# Patient Record
Sex: FEMALE | Race: WHITE | Employment: OTHER | URBAN - NONMETROPOLITAN AREA
[De-identification: names, ages, dates, MRNs, and addresses within clinical notes are randomized per-mention and may not be internally consistent; named-entity substitution may affect disease eponyms.]

---

## 2021-04-23 ENCOUNTER — OFFICE VISIT (OUTPATIENT)
Dept: ENT CLINIC | Age: 71
End: 2021-04-23
Payer: MEDICARE

## 2021-04-23 VITALS
HEART RATE: 76 BPM | RESPIRATION RATE: 14 BRPM | TEMPERATURE: 98.1 F | SYSTOLIC BLOOD PRESSURE: 126 MMHG | WEIGHT: 186.7 LBS | BODY MASS INDEX: 34.36 KG/M2 | DIASTOLIC BLOOD PRESSURE: 70 MMHG | HEIGHT: 62 IN

## 2021-04-23 DIAGNOSIS — H81.11 BENIGN PAROXYSMAL POSITIONAL VERTIGO OF RIGHT EAR: ICD-10-CM

## 2021-04-23 DIAGNOSIS — R22.1 LUMP IN NECK: Primary | ICD-10-CM

## 2021-04-23 DIAGNOSIS — R59.0 CERVICAL LYMPHADENOPATHY: ICD-10-CM

## 2021-04-23 PROCEDURE — 99203 OFFICE O/P NEW LOW 30 MIN: CPT | Performed by: OTOLARYNGOLOGY

## 2021-04-23 PROCEDURE — 1036F TOBACCO NON-USER: CPT | Performed by: OTOLARYNGOLOGY

## 2021-04-23 PROCEDURE — 95992 CANALITH REPOSITIONING PROC: CPT | Performed by: OTOLARYNGOLOGY

## 2021-04-23 PROCEDURE — 4040F PNEUMOC VAC/ADMIN/RCVD: CPT | Performed by: OTOLARYNGOLOGY

## 2021-04-23 PROCEDURE — 1090F PRES/ABSN URINE INCON ASSESS: CPT | Performed by: OTOLARYNGOLOGY

## 2021-04-23 PROCEDURE — 1123F ACP DISCUSS/DSCN MKR DOCD: CPT | Performed by: OTOLARYNGOLOGY

## 2021-04-23 PROCEDURE — G8427 DOCREV CUR MEDS BY ELIG CLIN: HCPCS | Performed by: OTOLARYNGOLOGY

## 2021-04-23 PROCEDURE — G8417 CALC BMI ABV UP PARAM F/U: HCPCS | Performed by: OTOLARYNGOLOGY

## 2021-04-23 PROCEDURE — 3017F COLORECTAL CA SCREEN DOC REV: CPT | Performed by: OTOLARYNGOLOGY

## 2021-04-23 PROCEDURE — G8400 PT W/DXA NO RESULTS DOC: HCPCS | Performed by: OTOLARYNGOLOGY

## 2021-04-23 RX ORDER — VIT C/B6/B5/MAGNESIUM/HERB 173 50-5-6-5MG
CAPSULE ORAL
COMMUNITY
End: 2022-06-30

## 2021-04-23 RX ORDER — LOSARTAN POTASSIUM 50 MG/1
50 TABLET ORAL DAILY
COMMUNITY
Start: 2021-04-01

## 2021-04-23 ASSESSMENT — ENCOUNTER SYMPTOMS
ABDOMINAL PAIN: 0
RHINORRHEA: 0
WHEEZING: 0
VOICE CHANGE: 0
COLOR CHANGE: 0
NAUSEA: 0
VOMITING: 0
APNEA: 0
TROUBLE SWALLOWING: 0
STRIDOR: 0
CHOKING: 0
DIARRHEA: 0
SINUS PRESSURE: 0
SORE THROAT: 0
COUGH: 0
SHORTNESS OF BREATH: 0
CHEST TIGHTNESS: 0
FACIAL SWELLING: 0

## 2021-04-23 NOTE — PROGRESS NOTES
note reviewed. Constitutional:       Appearance: She is well-developed. HENT:      Head: Normocephalic and atraumatic. No laceration. Comments:        Right Ear: Hearing, ear canal and external ear normal. No drainage or swelling. No middle ear effusion. Tympanic membrane is not perforated or erythematous. Left Ear: Hearing, tympanic membrane, ear canal and external ear normal. No drainage or swelling. No middle ear effusion. Tympanic membrane is not perforated or erythematous. Ears:      Comments: Scarring in eardrums     Nose: Nose normal. No septal deviation, mucosal edema or rhinorrhea. Mouth/Throat:      Mouth: Mucous membranes are not pale and not dry. No oral lesions. Pharynx: Oropharynx is clear. Uvula midline. No oropharyngeal exudate or posterior oropharyngeal erythema. Comments: LIps: lips normal     Mallampati 1  Base of tongue: symmetric,  Eyes:      Extraocular Movements:      Right eye: Normal extraocular motion and no nystagmus. Left eye: Normal extraocular motion and no nystagmus. Comments: Conjugate gaze   Neck:      Musculoskeletal: Neck supple. Thyroid: No thyroid mass or thyromegaly. Trachea: Phonation normal. No tracheal deviation. Comments:   Salivary glands not enlarged and normal to palpation    Pulmonary:      Effort: Pulmonary effort is normal. No retractions. Breath sounds: No stridor. Neurological:      Mental Status: She is alert and oriented to person, place, and time. Cranial Nerves: No cranial nerve deficit (VIIth N function intact bilat). Psychiatric:         Mood and Affect: Mood and affect normal.         Behavior: Behavior is cooperative. Epley maneuver, right side:  (canalith repositioning maneuver)  With the patient supine on the microscope table, once the findings of BPPV were confirmed, I had  the patient extend their neck, staying with the affected ear down, and tilted the bed down.  The head was then rotated in stages to the opposite side keeping the neck extended, and staying in each step-wise position for 2 minutes. With the opposite ear down, the patient was allowed to sit up and then turn their head straight. They tolerated the procedure well. Data:  All of the past medical history, past surgical history, family history,social history, allergies and current medications were reviewed with the patient. Assessment & Plan   Diagnoses and all orders for this visit:     Diagnosis Orders   1. Lump in neck  US HEAD NECK SOFT TISSUE THYROID   2. Cervical lymphadenopathy     3. Benign paroxysmal positional vertigo of right ear  IN CANALITH REPOSITIONING PROCEDURE, PER DAY       The findings were explained and her questions were answered. I was not able to palpate the mass she was describing. It felt like all adipose tissue. We did proceed with the canalith repositioning maneuver on this visit which she tolerated well. She was told not to lie flat for 2 days. Options were discussed including ordering an ultrasound of neck. She agreed. Return in about 2 weeks (around 5/7/2021) for Imaging Results Review. I, Aye Anne CMA ([facility]), am scribing for, and in the presence of Dr. Gabe Wahl. Electronically signed by Yoanna Vivas CMA ([facility]) on 4/23/21 at 1:51 PM EDT. (Please note that portions of this note were completed with a voice recognition program. Efforts were made to edit the dictations butoccasionally words are mis-transcribed.)    I agree to the above documentation placed by my scribe. I have personally evaluated this patient. Additional findings are as noted. I reviewed the scribe's note and agree with the documented findings and plan of care. Any areas of disagreement are corrected. I agree with the chief complaint, past medical history, past surgical history, allergies, medications, social and family history as documented unless otherwise noted below. Electronically signed by Abida Castorena MD on 5/9/2021 at 2:28 PM

## 2021-04-29 ENCOUNTER — HOSPITAL ENCOUNTER (OUTPATIENT)
Dept: ULTRASOUND IMAGING | Age: 71
Discharge: HOME | End: 2021-04-29
Payer: MEDICARE

## 2021-04-29 DIAGNOSIS — R22.1 LUMP IN NECK: ICD-10-CM

## 2021-04-29 PROCEDURE — 76536 US EXAM OF HEAD AND NECK: CPT

## 2021-04-30 ENCOUNTER — OFFICE VISIT (OUTPATIENT)
Dept: ENT CLINIC | Age: 71
End: 2021-04-30
Payer: MEDICARE

## 2021-04-30 VITALS
DIASTOLIC BLOOD PRESSURE: 82 MMHG | WEIGHT: 183.8 LBS | SYSTOLIC BLOOD PRESSURE: 130 MMHG | BODY MASS INDEX: 33.62 KG/M2 | RESPIRATION RATE: 12 BRPM | HEART RATE: 68 BPM | TEMPERATURE: 97.5 F

## 2021-04-30 DIAGNOSIS — R59.0 CERVICAL LYMPHADENOPATHY: ICD-10-CM

## 2021-04-30 DIAGNOSIS — R22.1 LUMP IN NECK: Primary | ICD-10-CM

## 2021-04-30 DIAGNOSIS — H81.11 BENIGN PAROXYSMAL POSITIONAL VERTIGO OF RIGHT EAR: ICD-10-CM

## 2021-04-30 PROCEDURE — 99212 OFFICE O/P EST SF 10 MIN: CPT | Performed by: OTOLARYNGOLOGY

## 2021-04-30 ASSESSMENT — ENCOUNTER SYMPTOMS
CHEST TIGHTNESS: 0
COLOR CHANGE: 0
VOICE CHANGE: 0
ABDOMINAL PAIN: 0
SINUS PRESSURE: 0
NAUSEA: 0
DIARRHEA: 0
VOMITING: 0
SHORTNESS OF BREATH: 0
CHOKING: 0
STRIDOR: 0
TROUBLE SWALLOWING: 0
RHINORRHEA: 0
FACIAL SWELLING: 0
APNEA: 0
SORE THROAT: 0
WHEEZING: 0
COUGH: 0

## 2021-04-30 NOTE — PROGRESS NOTES
[facility] [place] [facility]  [facility] EAR, NOSE AND THROAT  [facility]  Dept: [phone]  Dept Fax: [phone]  Loc: Yordy Blank is a 73 y.o. female who was referred byNo ref. provider found for:  Chief Complaint   Patient presents with    Results     Patient here for results of her Thyroid US. Yvette Rued HPI:     Dennis Saucedo is a 73 y.o. female who presents today for results of thyroid ultrasound. Thyroid US:  Bilateral supraclavicular lymph nodes are not enlarged by ultrasound criteria however architecture appears somewhat abnormal, there are rounded and elongated. Follow-up exam in 1- 3 months is recommended to confirm stability.           **This report has been created using voice recognition software.  It may contain minor errors which are inherent in voice recognition technology. **       Final report electronically signed by Dr Nimco Jasso on 4/29/2021        Feels the lump in her neck and thinks it has gotten smaller. She is turning over in bed quickly and she is feeling better. She has not had vertigo with her right ear down since the Epley maneuver    History: Allergies   Allergen Reactions    Penicillins Hives     Current Outpatient Medications   Medication Sig Dispense Refill    losartan (COZAAR) 50 MG tablet       Cholecalciferol 100 MCG (4000 UT) CAPS Take 1 capsule by mouth daily      Turmeric 500 MG CAPS Take by mouth      Cyanocobalamin (B-12 PO) Take by mouth      Calcium Carbonate-Vitamin D (CALCIUM 500 + D) 500-125 MG-UNIT TABS Take by mouth       No current facility-administered medications for this visit. Past Medical History:   Diagnosis Date    Hypertension       Past Surgical History:   Procedure Laterality Date    HERNIA REPAIR  1990    HYSTERECTOMY, TOTAL ABDOMINAL      2000    PARATHYROIDECTOMY  2015     History reviewed. No pertinent family history.   Social History Tobacco Use    Smoking status: Never Smoker    Smokeless tobacco: Never Used   Substance Use Topics    Alcohol use: Not Currently     Frequency: Never     Binge frequency: Never       Subjective:       Review of Systems   Constitutional: Negative for activity change, appetite change, chills, diaphoresis, fatigue, fever and unexpected weight change. HENT: Negative for congestion, dental problem, ear discharge, ear pain, facial swelling, hearing loss, mouth sores, nosebleeds, postnasal drip, rhinorrhea, sinus pressure, sneezing, sore throat, tinnitus, trouble swallowing and voice change. Eyes: Negative for visual disturbance. Respiratory: Negative for apnea, cough, choking, chest tightness, shortness of breath, wheezing and stridor. Cardiovascular: Negative for chest pain, palpitations and leg swelling. Gastrointestinal: Negative for abdominal pain, diarrhea, nausea and vomiting. Endocrine: Negative for cold intolerance, heat intolerance, polydipsia and polyuria. Genitourinary: Negative for dysuria, enuresis and hematuria. Musculoskeletal: Negative for arthralgias, gait problem, neck pain and neck stiffness. Skin: Negative for color change and rash. Allergic/Immunologic: Negative for environmental allergies, food allergies and immunocompromised state. Neurological: Negative for dizziness, syncope, facial asymmetry, speech difficulty, light-headedness and headaches. Hematological: Negative for adenopathy. Does not bruise/bleed easily. Psychiatric/Behavioral: Negative for confusion and sleep disturbance. The patient is not nervous/anxious.         Objective:     /82 (Site: Right Upper Arm, Position: Sitting)   Pulse 68   Temp 97.5 °F (36.4 °C) (Infrared)   Resp 12   Wt 183 lb 12.8 oz (83.4 kg)   BMI 33.62 kg/m²     Physical Exam   Neck:  Again, no palpable masses    Data:  All of the past medical history, past surgical history, family history,social history, allergies and current medications were reviewed with the patient. Assessment & Plan   Diagnoses and all orders for this visit:     Diagnosis Orders   1. Lump in neck  US THYROID   2. Cervical lymphadenopathy     3. Benign paroxysmal positional vertigo of right ear      Improved following Epley       The findings were explained and her questions were answered. Options were discussed including 2 month ultrasound follow up. Call sooner if dizziness comes back. She verbalized understanding. 3  month follow up Osvaldo Steen CMA ([facility]), am scribing for, and in the presence of Dr. Rashida Montes De Oca. Electronically signed by Sarah Camacho CMA ([facility]) on 4/30/21 at 11:50 AM EDT. (Please note that portions of this note were completed with a voice recognition program. Efforts were made to edit the dictations butoccasionally words are mis-transcribed.)    I agree to the above documentation placed by my scribe. I have personally evaluated this patient. Additional findings are as noted. I reviewed the scribe's note and agree with the documented findings and plan of care. Any areas of disagreement are corrected. I agree with the chief complaint, past medical history, past surgical history, allergies, medications, social and family history as documented unless otherwise noted below.      Electronically signed by Jamel Almaguer MD on 5/9/2021 at 2:32 PM

## 2022-06-30 ENCOUNTER — OFFICE VISIT (OUTPATIENT)
Dept: PHYSICAL MEDICINE AND REHAB | Age: 72
End: 2022-06-30
Payer: MEDICARE

## 2022-06-30 VITALS
WEIGHT: 192.4 LBS | BODY MASS INDEX: 35.41 KG/M2 | SYSTOLIC BLOOD PRESSURE: 110 MMHG | HEIGHT: 62 IN | DIASTOLIC BLOOD PRESSURE: 74 MMHG

## 2022-06-30 DIAGNOSIS — G89.29 OTHER CHRONIC PAIN: ICD-10-CM

## 2022-06-30 DIAGNOSIS — M79.18 MYOFASCIAL PAIN: ICD-10-CM

## 2022-06-30 DIAGNOSIS — G89.29 CHRONIC BILATERAL LOW BACK PAIN WITHOUT SCIATICA: ICD-10-CM

## 2022-06-30 DIAGNOSIS — M47.816 LUMBAR SPONDYLOSIS: Primary | ICD-10-CM

## 2022-06-30 DIAGNOSIS — M54.50 CHRONIC BILATERAL LOW BACK PAIN WITHOUT SCIATICA: ICD-10-CM

## 2022-06-30 PROCEDURE — 1123F ACP DISCUSS/DSCN MKR DOCD: CPT | Performed by: ANESTHESIOLOGY

## 2022-06-30 PROCEDURE — 99204 OFFICE O/P NEW MOD 45 MIN: CPT | Performed by: ANESTHESIOLOGY

## 2022-06-30 RX ORDER — METOPROLOL SUCCINATE 25 MG/1
25 TABLET, EXTENDED RELEASE ORAL NIGHTLY
COMMUNITY
Start: 2022-06-27

## 2022-06-30 NOTE — PROGRESS NOTES
Chronic Pain/PM&R Clinic Note     Encounter Date: 6/30/22    Subjective:   Chief Complaint:   Chief Complaint   Patient presents with    New Patient     lumbar pain        History of Present Illness:   Zofia Ramirez is a 81 y.o. female seen in the clinic initially on 06/30/22 upon request from [address] [place] [address]  (PCP) for her history of chronic low back pain. She states that she has been dealing with low back pain for several years without inciting event. She describes majority of her back pain to be in the small of her low back. She describes this as a constant aching 4/10 pain. She also has some pain that radiates to her left buttocks and down her right thigh at times. These are more intermittent pains for her. She denies any pain that radiates further past her knee, associated leg weakness, leg numbness/tingling, or bowel/bladder incontinence episodes. She states that her pain is worse with activities that involve standing on her feet for extended duration of time, walking, bending, or trying to ride her horse. She states her pain is alleviated with rest, sitting, and medications. 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 History of Interventions:   Surgery: No previous lumbar surgeries  Injections: None    Current Treatment Medications:   Tylenol PRN    Historical Treatment Medications:   None    Imaging/Studies:  XR L-spine (4/4/22)       Past Medical History:   Diagnosis Date    Hypertension        Past Surgical History:   Procedure Laterality Date    HERNIA REPAIR  1990    HYSTERECTOMY, TOTAL ABDOMINAL (CERVIX REMOVED)      2000    PARATHYROIDECTOMY  2015       History reviewed. No pertinent family history.       Medications & Allergies:   Current Outpatient Medications   Medication Instructions    Cholecalciferol (VITAMIN D3) 125 MCG (5000 UT) TABS Oral    losartan (COZAAR) 50 mg, Oral, DAILY    metoprolol succinate (TOPROL XL) 25 mg, Oral, DAILY Allergies   Allergen Reactions    Penicillins Hives       Review of Systems:   Constitutional: negative for weight changes or fevers  Genitourinary: negative for bowel/bladder incontinence   Musculoskeletal: positive for low back pain  Neurological: negative for any leg weakness or numbness/tingling  Behavioral/Psych: negative for anxiety/depression   All other systems reviewed and are negative    Objective:     Vitals:    06/30/22 1437   BP: 110/74     Constitutional: Pleasant, no acute distress   Head: Normocephalic, atraumatic   Eyes: Conjunctivae normal   Neck: Supple, symmetrical   Lungs: Normal respiratory effort, non-labored breathing   Cardiovascular: Limbs warm and well perfused   Abdomen: Non-protruded   Musculoskeletal: Muscle bulk symmetric, no atrophy, no gross deformities    · Lumbar Spine: ROM reduced in extension. Lumbar paraspinals tender to palpation bilaterally. SLR neg bilaterally. SAVANNA neg bilaterally. Positive facet loading bilaterally. Bilateral SI joints non-tender to palpation. Neurological: Cranial nerves II-XII grossly intact. · Gait - Antalgic gait. Ambulates without assistive device. · Motor: 5/5 muscle strength in bilateral hip flexion, knee flexion, knee extension, ankle dorsiflexion, and ankle plantar flexion   · Sensory: LT sensation intact in lower limbs   · Reflexes: 1+ symmetrical in bilateral achilles, 1+ bilateral patellar, negative ankle clonus  Skin: No rashes or lesions visible in low back  Psychological: Cooperative, no exaggerated pain behaviors       Assessment:    Diagnosis Orders   1. Lumbar spondylosis  CHG FLUOR NEEDLE/CATH SPINE/PARASPINAL DX/THER ADDON    MN INJ DX/THER AGNT PARAVERT FACET JOINT, LUMBAR/SAC, 1ST LEVEL    MN INJ DX/THER AGNT PARAVERT FACET JOINT, LUMBAR/SAC, 2ND LEVEL   2. Myofascial pain     3. Other chronic pain     4. Chronic bilateral low back pain without sciatica         Zen Aparicio is a 81 y. o.female presenting to the pain clinic for evaluation of chronic low back pain. Her clinical history and physical lamination are consistent with lumbar facet mediated pain secondary to lumbar spondylosis and degenerative disc disease. I set patient up for diagnostic lumbar medial branch block starting the bilateral facet joints of L4/L5 and L5/S1. We will anticipate proceeding forward with a lumbar radiofrequency ablation and complement this with weight loss and core strength and stability. Plan: The following treatment recommendations and plan were discussed in detail with Bev Hein. Imaging/Studies/Labs:   I have reviewed patients imaging of XR L-spine and results were discussed with patient today. Analgesics:   Patient is taking Acetaminophen. Patient informed that the maximum amount of acetaminophen taken on a regular basis should only be 4000 mg per day. Adjuvants:   None; patient wants to pursue injection therapy only. Interventions: In presence of lumbar axial back pain and with physical exam consistent for facetal pain, the option of medial branch blocks at bilateral L4/L5 and L5/S1 was chosen. The risks and benefits were discussed in detail with the patient. Patient wants to proceed with the injection. Anticoagulation/NPO Recommendations:   Patient does not need to hold any medications prior to the procedure. Patient will need to be NPO x 8 hours prior to the procedure. Plan to start an IV prior to the procedure. Multidisciplinary Pain Management:   In the presence of complex, chronic, and multi-factorial pain, the importance of a multidisciplinary approach to pain management in the patients management regimen was emphasized and discussed in great detail.    PHYSICAL THERAPY: Patient is advised to continue gentle ROM stretching as discussed in clinic    Referrals:  None    Prescriptions Written This Visit:   None    Follow-up: For lumbar MBBs      Alec Rodriguez DO  Interventional Pain Management/PM&R   [place]

## 2022-07-20 ENCOUNTER — PREP FOR PROCEDURE (OUTPATIENT)
Dept: PHYSICAL MEDICINE AND REHAB | Age: 72
End: 2022-07-20

## 2022-07-22 NOTE — DISCHARGE INSTRUCTIONS

## 2022-07-26 ENCOUNTER — HOSPITAL ENCOUNTER (OUTPATIENT)
Age: 72
Setting detail: OUTPATIENT SURGERY
Discharge: HOME | End: 2022-07-26
Attending: ANESTHESIOLOGY | Admitting: ANESTHESIOLOGY
Payer: MEDICARE

## 2022-07-26 ENCOUNTER — APPOINTMENT (OUTPATIENT)
Dept: GENERAL RADIOLOGY | Age: 72
End: 2022-07-26
Attending: ANESTHESIOLOGY
Payer: MEDICARE

## 2022-07-26 VITALS
WEIGHT: 189.6 LBS | OXYGEN SATURATION: 91 % | SYSTOLIC BLOOD PRESSURE: 111 MMHG | HEART RATE: 66 BPM | HEIGHT: 62 IN | BODY MASS INDEX: 34.89 KG/M2 | TEMPERATURE: 98.7 F | DIASTOLIC BLOOD PRESSURE: 56 MMHG | RESPIRATION RATE: 16 BRPM

## 2022-07-26 PROCEDURE — 99152 MOD SED SAME PHYS/QHP 5/>YRS: CPT | Performed by: ANESTHESIOLOGY

## 2022-07-26 PROCEDURE — 6360000002 HC RX W HCPCS: Performed by: ANESTHESIOLOGY

## 2022-07-26 PROCEDURE — 64494 INJ PARAVERT F JNT L/S 2 LEV: CPT | Performed by: ANESTHESIOLOGY

## 2022-07-26 PROCEDURE — 64493 INJ PARAVERT F JNT L/S 1 LEV: CPT | Performed by: ANESTHESIOLOGY

## 2022-07-26 PROCEDURE — 2709999900 HC NON-CHARGEABLE SUPPLY: Performed by: ANESTHESIOLOGY

## 2022-07-26 PROCEDURE — 3600000056 HC PAIN LEVEL 4 BASE: Performed by: ANESTHESIOLOGY

## 2022-07-26 PROCEDURE — 7100000010 HC PHASE II RECOVERY - FIRST 15 MIN: Performed by: ANESTHESIOLOGY

## 2022-07-26 PROCEDURE — 3209999900 FLUORO FOR SURGICAL PROCEDURES

## 2022-07-26 PROCEDURE — 2500000003 HC RX 250 WO HCPCS: Performed by: ANESTHESIOLOGY

## 2022-07-26 PROCEDURE — 7100000011 HC PHASE II RECOVERY - ADDTL 15 MIN: Performed by: ANESTHESIOLOGY

## 2022-07-26 RX ORDER — MIDAZOLAM HYDROCHLORIDE 1 MG/ML
INJECTION INTRAMUSCULAR; INTRAVENOUS PRN
Status: DISCONTINUED | OUTPATIENT
Start: 2022-07-26 | End: 2022-07-26 | Stop reason: ALTCHOICE

## 2022-07-26 RX ORDER — LIDOCAINE HYDROCHLORIDE 10 MG/ML
INJECTION, SOLUTION EPIDURAL; INFILTRATION; INTRACAUDAL; PERINEURAL PRN
Status: DISCONTINUED | OUTPATIENT
Start: 2022-07-26 | End: 2022-07-26 | Stop reason: ALTCHOICE

## 2022-07-26 RX ORDER — FENTANYL CITRATE 50 UG/ML
INJECTION, SOLUTION INTRAMUSCULAR; INTRAVENOUS PRN
Status: DISCONTINUED | OUTPATIENT
Start: 2022-07-26 | End: 2022-07-26 | Stop reason: ALTCHOICE

## 2022-07-26 RX ORDER — BUPIVACAINE HYDROCHLORIDE 5 MG/ML
INJECTION, SOLUTION PERINEURAL PRN
Status: DISCONTINUED | OUTPATIENT
Start: 2022-07-26 | End: 2022-07-26 | Stop reason: ALTCHOICE

## 2022-07-26 ASSESSMENT — PAIN SCALES - GENERAL
PAINLEVEL_OUTOF10: 0
PAINLEVEL_OUTOF10: 0

## 2022-07-26 NOTE — PROCEDURES
Pre-operative Diagnosis: Lumbar facet pain     Post-operative Diagnosis: Lumbar facet pain     Procedure: Bilateral lumbar medial branch blocks targeting facet joints of L4/L5 and L5/S1     Procedure Description:  After consent was obtained, the patient was placed in the prone position with a pillow under the abdomen to reduce the lordotic curve of the lumbar spine. The lower back was prepped with chloraprep and draped in a sterile fashion. Then, 0.5 cc of 1 % lidocaine was used for local anesthesia of the skin and superficial subcutaneous tissues. Three 22-gauge 3.5-inch spinal needles were advanced under fluoroscopy in an AP view: one at the sacral ala and two at the junction of the right superior articular process and the transverse process of the L4 and L5 vertebrae. There was no paresthesias, heme, or CSF obtained. Needle placement was confirmed using AP and oblique views. Then, 0.5 cc of 0.5% bupivacaine was injected at each site without complications. The procedure was repeated on the contralateral side. The patient tolerated the procedure well, transported to the recovery room, and discharged in ambulatory fashion.      Procedural Complications: None  Estimated Blood Loss: 0 mL      IV sedation was used during the procedure:  - Moderate intravenous conscious sedation was supervised by Dr. Sarah Rios  - The patient was independently monitored by a Registered Nurse assigned to the procedure room  - Monitoring included automated blood pressure, continuous EKG, and continuous pulse oximetry  - The detailed conscious record is permanently stored in the [place] [id number]  - The following is the conscious sedation record:  Start Time: 13:36   End Time : 13:51  Duration: 15 minutes   Medications Administered: 1 mg Versed, 50 mcg Fentanyl     Alec Rodriguez DO  Interventional Pain Management/PM&R   [facility]

## 2022-07-26 NOTE — PRE SEDATION
expectations with patient and/or responsible adult completed. 5. Patient examined immediately prior to the procedure.  (Refer to nursing sedation/analgesia documentation record)    Rosanna Galindo DO  Electronically signed 7/26/2022 at 4:41 PM

## 2022-07-26 NOTE — H&P
Today, patient presents for planned medial branch blocks at bilateral L4/L5 and L5/S1. This note is reflective of the patient's previous visit for evaluation. We will proceed with today's planned procedure. Since patient's last visit for evaluation, there have been no interval changes in medical history. Patient has no new numbness, weakness, or focal neurological deficit since evaluation. Patient has no contraindications to injection (no anticoagulation or recent antibiotic intake for active infections), and has a  present or is able to drive themselves (as discussed and cleared by physician). Allergies to latex, contrast dye, and steroid medications have been confirmed with the patient prior to the procedure. NPO necessity has been assessed and accepted based on procedure complexity. The risks and benefits of the procedure have been explained including but are not limited to infection, bleeding, paralysis, immediate post procedure weakness, and dizziness; the patient acknowledges understanding and desires to proceed with the procedure. Patient has signed consent for same procedure as discussed in previous clinic encounter. All other questions and concerns were addressed at bedside. See procedure note for full details. Post procedure Instructions: The patient was advised not to drive during the day of the procedure and not to engage in any significant decision making (unless otherwise states by physician). The patient was also advised to be cautious with walking/activity for 24 hours following today's visit and asked not to engage in over-exertion (unless otherwise states by physician). After this time, it is ok to resume pre-procedure level of activity. Patient advised to apply ice to site of injection in situations of pain and discomfort. Patient advised to not submerge site of injection during bath or pool activities for approximately 24 hours post-procedure.  Patient attested to understanding post procedure directions / restrictions. All other questions and concerns addressed before patient discharge in ambulatory fashion. Chronic Pain/PM&R Clinic Note     Encounter Date: 6/30/22    Subjective:   Chief Complaint:   No chief complaint on file. History of Present Illness:   Arnaldo Chavez is a 81 y.o. female seen in the clinic initially on 07/26/22 upon request from [address] [place] [address]  (PCP) for her history of chronic low back pain. She states that she has been dealing with low back pain for several years without inciting event. She describes majority of her back pain to be in the small of her low back. She describes this as a constant aching 4/10 pain. She also has some pain that radiates to her left buttocks and down her right thigh at times. These are more intermittent pains for her. She denies any pain that radiates further past her knee, associated leg weakness, leg numbness/tingling, or bowel/bladder incontinence episodes. She states that her pain is worse with activities that involve standing on her feet for extended duration of time, walking, bending, or trying to ride her horse. She states her pain is alleviated with rest, sitting, and medications. 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 History of Interventions:   Surgery: No previous lumbar surgeries  Injections: None    Current Treatment Medications:   Tylenol PRN    Historical Treatment Medications:   None    Imaging/Studies:  XR L-spine (4/4/22)       Past Medical History:   Diagnosis Date    Hypertension        Past Surgical History:   Procedure Laterality Date    HERNIA REPAIR  1990    HYSTERECTOMY, TOTAL ABDOMINAL (CERVIX REMOVED)      2000    PARATHYROIDECTOMY  2015       No family history on file.       Medications & Allergies:   Current Outpatient Medications   Medication Instructions    Cholecalciferol (VITAMIN D3) 125 MCG (5000 UT) TABS Oral    losartan (COZAAR) 50 mg, Oral, DAILY    metoprolol succinate (TOPROL XL) 25 mg, Oral, DAILY       Allergies   Allergen Reactions    Penicillins Hives       Review of Systems:   Constitutional: negative for weight changes or fevers  Genitourinary: negative for bowel/bladder incontinence   Musculoskeletal: positive for low back pain  Neurological: negative for any leg weakness or numbness/tingling  Behavioral/Psych: negative for anxiety/depression   All other systems reviewed and are negative    Objective: There were no vitals filed for this visit. Constitutional: Pleasant, no acute distress   Head: Normocephalic, atraumatic   Eyes: Conjunctivae normal   Neck: Supple, symmetrical   Lungs: Normal respiratory effort, non-labored breathing   Cardiovascular: Limbs warm and well perfused   Abdomen: Non-protruded   Musculoskeletal: Muscle bulk symmetric, no atrophy, no gross deformities    · Lumbar Spine: ROM reduced in extension. Lumbar paraspinals tender to palpation bilaterally. SLR neg bilaterally. SAVANNA neg bilaterally. Positive facet loading bilaterally. Bilateral SI joints non-tender to palpation. Neurological: Cranial nerves II-XII grossly intact. · Gait - Antalgic gait. Ambulates without assistive device. · Motor: 5/5 muscle strength in bilateral hip flexion, knee flexion, knee extension, ankle dorsiflexion, and ankle plantar flexion   · Sensory: LT sensation intact in lower limbs   · Reflexes: 1+ symmetrical in bilateral achilles, 1+ bilateral patellar, negative ankle clonus  Skin: No rashes or lesions visible in low back  Psychological: Cooperative, no exaggerated pain behaviors       Assessment:    Diagnosis Orders   1. Lumbar spondylosis  CHG FLUOR NEEDLE/CATH SPINE/PARASPINAL DX/THER ADDON    CT INJ DX/THER AGNT PARAVERT FACET JOINT, LUMBAR/SAC, 1ST LEVEL    CT INJ DX/THER AGNT PARAVERT FACET JOINT, LUMBAR/SAC, 2ND LEVEL   2. Myofascial pain     3. Other chronic pain     4.  Chronic bilateral low back pain without sciatica Christy Coronado is a 81 y. o.female presenting to the pain clinic for evaluation of chronic low back pain. Her clinical history and physical lamination are consistent with lumbar facet mediated pain secondary to lumbar spondylosis and degenerative disc disease. I set patient up for diagnostic lumbar medial branch block starting the bilateral facet joints of L4/L5 and L5/S1. We will anticipate proceeding forward with a lumbar radiofrequency ablation and complement this with weight loss and core strength and stability. Plan: The following treatment recommendations and plan were discussed in detail with Bev Hein. Imaging/Studies/Labs:   I have reviewed patients imaging of XR L-spine and results were discussed with patient today. Analgesics:   Patient is taking Acetaminophen. Patient informed that the maximum amount of acetaminophen taken on a regular basis should only be 4000 mg per day. Adjuvants:   None; patient wants to pursue injection therapy only. Interventions: In presence of lumbar axial back pain and with physical exam consistent for facetal pain, the option of medial branch blocks at bilateral L4/L5 and L5/S1 was chosen. The risks and benefits were discussed in detail with the patient. Patient wants to proceed with the injection. Anticoagulation/NPO Recommendations:   Patient does not need to hold any medications prior to the procedure. Patient will need to be NPO x 8 hours prior to the procedure. Plan to start an IV prior to the procedure. Multidisciplinary Pain Management:   In the presence of complex, chronic, and multi-factorial pain, the importance of a multidisciplinary approach to pain management in the patients management regimen was emphasized and discussed in great detail.    PHYSICAL THERAPY: Patient is advised to continue gentle ROM stretching as discussed in clinic    Referrals:  None    Prescriptions Written This Visit:   None    Follow-up: For lumbar MBBs      Alec Morocho, DO  Interventional Pain Management/PM&R   [place]

## 2022-07-26 NOTE — PROGRESS NOTES
[id number]  pt. Awake and oriented on arrival to phase II. Pt. Denies pain. VSS. Pt. Refusing anything to eat or drink at this time. 1400  IV discontinued. Pt. Continues to deny pain. 1410  phase II criteria met. Pt. Discharged per ambulation with nurse. Pt. Denies lightheadedness. Pt. Tolerated well. Discharge instructions with pt.

## 2022-08-08 ENCOUNTER — OFFICE VISIT (OUTPATIENT)
Dept: PHYSICAL MEDICINE AND REHAB | Age: 72
End: 2022-08-08
Payer: MEDICARE

## 2022-08-08 VITALS
BODY MASS INDEX: 34.78 KG/M2 | SYSTOLIC BLOOD PRESSURE: 124 MMHG | HEIGHT: 62 IN | DIASTOLIC BLOOD PRESSURE: 72 MMHG | WEIGHT: 189 LBS

## 2022-08-08 DIAGNOSIS — G89.29 CHRONIC BILATERAL LOW BACK PAIN WITHOUT SCIATICA: ICD-10-CM

## 2022-08-08 DIAGNOSIS — M47.816 LUMBAR SPONDYLOSIS: Primary | ICD-10-CM

## 2022-08-08 DIAGNOSIS — M79.18 MYOFASCIAL PAIN: ICD-10-CM

## 2022-08-08 DIAGNOSIS — M54.50 CHRONIC BILATERAL LOW BACK PAIN WITHOUT SCIATICA: ICD-10-CM

## 2022-08-08 DIAGNOSIS — G89.29 OTHER CHRONIC PAIN: ICD-10-CM

## 2022-08-08 PROCEDURE — 1123F ACP DISCUSS/DSCN MKR DOCD: CPT | Performed by: ANESTHESIOLOGY

## 2022-08-08 PROCEDURE — 99214 OFFICE O/P EST MOD 30 MIN: CPT | Performed by: ANESTHESIOLOGY

## 2022-08-08 NOTE — PROGRESS NOTES
Chronic Pain/PM&R Clinic Note     Encounter Date: 8/8/22    Subjective:   Chief Complaint:   Chief Complaint   Patient presents with    Follow Up After Procedure     LUMBAR FACET medial branch blocks bilateral Lumbar 4 5  5 Sacral 1 7/26/22       History of Present Illness:   [place] Herr is a 81 y.o. female seen in the clinic initially on 05/16/22 upon request from [address] [place] [address]  (PCP) for her history of chronic low back pain. She states that she has been dealing with low back pain for several years without inciting event. She describes majority of her back pain to be in the small of her low back. She describes this as a constant aching 4/10 pain. She also has some pain that radiates to her left buttocks and down her right thigh at times. These are more intermittent pains for her. She denies any pain that radiates further past her knee, associated leg weakness, leg numbness/tingling, or bowel/bladder incontinence episodes. She states that her pain is worse with activities that involve standing on her feet for extended duration of time, walking, bending, or trying to ride her horse. She states her pain is alleviated with rest, sitting, and medications. 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 Today, 8/8/2022, patient presents for planned follow-up for chronic low back pain. She underwent diagnostic lumbar medial branch blocks on 7/26/2022. She reports 100% relief lasting 3 days in duration. She states that she was able to stand upright without any low back pain during this timeframe. She states that she did not to take any breakthrough Tylenol as well. She would like to proceed with the confirmatory steps to her injection series. She denies any other new symptoms this point.       History of Interventions:   Surgery: No previous lumbar surgeries  Injections: Diagnostic lumbar MBBs (7/26/22) - 100% relief x 3 days     Current Treatment Medications:   Tylenol PRN    Historical Treatment Medications:   None    Imaging/Studies:  XR L-spine (4/4/22)       Past Medical History:   Diagnosis Date    Arthritis     Hypertension        Past Surgical History:   Procedure Laterality Date    HERNIA REPAIR  1990    HYSTERECTOMY, TOTAL ABDOMINAL (CERVIX REMOVED)      2000    PAIN MANAGEMENT PROCEDURE Bilateral 7/26/2022    LUMBAR FACET medial branch blocks bilateral Lumbar 4 5  5 Sacral 1 performed by Samira Travis DO at [facility]  2015       No family history on file. Medications & Allergies:   Current Outpatient Medications   Medication Instructions    Cholecalciferol (VITAMIN D3) 125 MCG (5000 UT) TABS Oral    losartan (COZAAR) 50 mg, Oral, DAILY    metoprolol succinate (TOPROL XL) 25 mg, Oral, Nightly       Allergies   Allergen Reactions    Penicillins Hives       Review of Systems:   Constitutional: negative for weight changes or fevers  Genitourinary: negative for bowel/bladder incontinence   Musculoskeletal: positive for low back pain  Neurological: negative for any leg weakness or numbness/tingling  Behavioral/Psych: negative for anxiety/depression   All other systems reviewed and are negative    Objective:     Vitals:    08/08/22 1608   BP: 124/72     Constitutional: Pleasant, no acute distress   Head: Normocephalic, atraumatic   Eyes: Conjunctivae normal   Neck: Supple, symmetrical   Lungs: Normal respiratory effort, non-labored breathing   Cardiovascular: Limbs warm and well perfused   Abdomen: Non-protruded   Musculoskeletal: Muscle bulk symmetric, no atrophy, no gross deformities    · Lumbar Spine: ROM reduced in extension. Lumbar paraspinals tender to palpation bilaterally. SLR neg bilaterally. SAVANNA neg bilaterally. Positive facet loading bilaterally. Bilateral SI joints non-tender to palpation. Neurological: Cranial nerves II-XII grossly intact. · Gait - Antalgic gait. Ambulates without assistive device.    · Motor: No focal motor deficits in lower limbs  Skin: No rashes or lesions visible in low back  Psychological: Cooperative, no exaggerated pain behaviors       Assessment:    Diagnosis Orders   1. Lumbar spondylosis  CHG FLUOR NEEDLE/CATH SPINE/PARASPINAL DX/THER ADDON    NM INJ DX/THER AGNT PARAVERT FACET JOINT, LUMBAR/SAC, 1ST LEVEL    NM INJ DX/THER AGNT PARAVERT FACET JOINT, LUMBAR/SAC, 2ND LEVEL      2. Myofascial pain        3. Other chronic pain        4. Chronic bilateral low back pain without sciatica              Liz Cantrell is a 81 y. o.female presenting to the pain clinic for evaluation of chronic low back pain. Her clinical history and physical lamination are consistent with lumbar facet mediated pain secondary to lumbar spondylosis and degenerative disc disease. I set patient up for diagnostic lumbar medial branch block starting the bilateral facet joints of L4/L5 and L5/S1. We will anticipate proceeding forward with a lumbar radiofrequency ablation and complement this with weight loss and core strength and stability. She underwent successful diagnostic lumbar medial branch blocks I set her up for confirmatory blocks at the same levels. Plan: The following treatment recommendations and plan were discussed in detail with Bev Hein. Imaging/Studies/Labs:   I have reviewed patients imaging of XR L-spine and results were discussed with patient today. Analgesics:   Patient is taking Acetaminophen. Patient informed that the maximum amount of acetaminophen taken on a regular basis should only be 4000 mg per day. Adjuvants:   None; patient wants to pursue injection therapy only. Interventions: In presence of lumbar axial back pain and with physical exam consistent for facetal pain, confirmatory medial branch blocks at bilateral L4/L5 and L5/S1 was chosen. The risks and benefits were discussed in detail with the patient. Patient wants to proceed with the injection.     Anticoagulation/NPO Recommendations:   Patient does not need to hold any medications prior to the procedure. Patient will need to be NPO x 8 hours prior to the procedure. Plan to start an IV prior to the procedure. Multidisciplinary Pain Management:   In the presence of complex, chronic, and multi-factorial pain, the importance of a multidisciplinary approach to pain management in the patients management regimen was emphasized and discussed in great detail.    PHYSICAL THERAPY: Patient is advised to continue gentle ROM stretching as discussed in clinic    Referrals:  None    Prescriptions Written This Visit:   None    Follow-up: For confirmatory lumbar MBBs      Shane Sepulveda DO  Interventional Pain Management/PM&R   [place]

## 2022-08-09 ENCOUNTER — TELEPHONE (OUTPATIENT)
Dept: PHYSICAL MEDICINE AND REHAB | Age: 72
End: 2022-08-09

## 2022-08-12 NOTE — DISCHARGE INSTRUCTIONS

## 2022-08-16 ENCOUNTER — PREP FOR PROCEDURE (OUTPATIENT)
Dept: PHYSICAL MEDICINE AND REHAB | Age: 72
End: 2022-08-16

## 2022-08-16 NOTE — H&P
Today, patient presents for planned confirmatory medial branch blocks at bilateral L4/L5 and L5/S1. This note is reflective of the patient's previous visit for evaluation. We will proceed with today's planned procedure. Since patient's last visit for evaluation, there have been no interval changes in medical history. Patient has no new numbness, weakness, or focal neurological deficit since evaluation. Patient has no contraindications to injection (no anticoagulation or recent antibiotic intake for active infections), and has a  present or is able to drive themselves (as discussed and cleared by physician). Allergies to latex, contrast dye, and steroid medications have been confirmed with the patient prior to the procedure. NPO necessity has been assessed and accepted based on procedure complexity. The risks and benefits of the procedure have been explained including but are not limited to infection, bleeding, paralysis, immediate post procedure weakness, and dizziness; the patient acknowledges understanding and desires to proceed with the procedure. Patient has signed consent for same procedure as discussed in previous clinic encounter. All other questions and concerns were addressed at bedside. See procedure note for full details. Post procedure Instructions: The patient was advised not to drive during the day of the procedure and not to engage in any significant decision making (unless otherwise states by physician). The patient was also advised to be cautious with walking/activity for 24 hours following today's visit and asked not to engage in over-exertion (unless otherwise states by physician). After this time, it is ok to resume pre-procedure level of activity. Patient advised to apply ice to site of injection in situations of pain and discomfort. Patient advised to not submerge site of injection during bath or pool activities for approximately 24 hours post-procedure.  Patient attested to understanding post procedure directions / restrictions. All other questions and concerns addressed before patient discharge in ambulatory fashion. Chronic Pain/PM&R Clinic Note     Encounter Date: 8/8/22    Subjective:   Chief Complaint:   No chief complaint on file. History of Present Illness:   Manju Campbell is a 81 y.o. female seen in the clinic initially on 05/16/22 upon request from [address] [place] [address]  (PCP) for her history of chronic low back pain. She states that she has been dealing with low back pain for several years without inciting event. She describes majority of her back pain to be in the small of her low back. She describes this as a constant aching 4/10 pain. She also has some pain that radiates to her left buttocks and down her right thigh at times. These are more intermittent pains for her. She denies any pain that radiates further past her knee, associated leg weakness, leg numbness/tingling, or bowel/bladder incontinence episodes. She states that her pain is worse with activities that involve standing on her feet for extended duration of time, walking, bending, or trying to ride her horse. She states her pain is alleviated with rest, sitting, and medications. 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 Today, 8/8/2022, patient presents for planned follow-up for chronic low back pain. She underwent diagnostic lumbar medial branch blocks on 7/26/2022. She reports 100% relief lasting 3 days in duration. She states that she was able to stand upright without any low back pain during this timeframe. She states that she did not to take any breakthrough Tylenol as well. She would like to proceed with the confirmatory steps to her injection series. She denies any other new symptoms this point.       History of Interventions:   Surgery: No previous lumbar surgeries  Injections: Diagnostic lumbar MBBs (7/26/22) - 100% relief x 3 days     Current Treatment Medications:   Tylenol PRN    Historical Treatment Medications:   None    Imaging/Studies:  XR L-spine (4/4/22)       Past Medical History:   Diagnosis Date    Arthritis     Hypertension        Past Surgical History:   Procedure Laterality Date    HERNIA REPAIR  1990    HYSTERECTOMY, TOTAL ABDOMINAL (CERVIX REMOVED)      2000    PAIN MANAGEMENT PROCEDURE Bilateral 7/26/2022    LUMBAR FACET medial branch blocks bilateral Lumbar 4 5  5 Sacral 1 performed by Radha Hill DO at [facility]  2015       No family history on file. Medications & Allergies:   Current Outpatient Medications   Medication Instructions    Cholecalciferol (VITAMIN D3) 125 MCG (5000 UT) TABS Oral    losartan (COZAAR) 50 mg, Oral, DAILY    metoprolol succinate (TOPROL XL) 25 mg, Oral, Nightly       Allergies   Allergen Reactions    Penicillins Hives       Review of Systems:   Constitutional: negative for weight changes or fevers  Genitourinary: negative for bowel/bladder incontinence   Musculoskeletal: positive for low back pain  Neurological: negative for any leg weakness or numbness/tingling  Behavioral/Psych: negative for anxiety/depression   All other systems reviewed and are negative    Objective: There were no vitals filed for this visit. Constitutional: Pleasant, no acute distress   Head: Normocephalic, atraumatic   Eyes: Conjunctivae normal   Neck: Supple, symmetrical   Lungs: Normal respiratory effort, non-labored breathing   Cardiovascular: Limbs warm and well perfused   Abdomen: Non-protruded   Musculoskeletal: Muscle bulk symmetric, no atrophy, no gross deformities    · Lumbar Spine: ROM reduced in extension. Lumbar paraspinals tender to palpation bilaterally. SLR neg bilaterally. SAVANNA neg bilaterally. Positive facet loading bilaterally. Bilateral SI joints non-tender to palpation. Neurological: Cranial nerves II-XII grossly intact. · Gait - Antalgic gait.  Ambulates without assistive device. · Motor: No focal motor deficits in lower limbs  Skin: No rashes or lesions visible in low back  Psychological: Cooperative, no exaggerated pain behaviors       Assessment:    Diagnosis Orders   1. Lumbar spondylosis  CHG FLUOR NEEDLE/CATH SPINE/PARASPINAL DX/THER ADDON    MN INJ DX/THER AGNT PARAVERT FACET JOINT, LUMBAR/SAC, 1ST LEVEL    MN INJ DX/THER AGNT PARAVERT FACET JOINT, LUMBAR/SAC, 2ND LEVEL      2. Myofascial pain        3. Other chronic pain        4. Chronic bilateral low back pain without sciatica              Sita Almaguer is a 81 y. o.female presenting to the pain clinic for evaluation of chronic low back pain. Her clinical history and physical lamination are consistent with lumbar facet mediated pain secondary to lumbar spondylosis and degenerative disc disease. I set patient up for diagnostic lumbar medial branch block starting the bilateral facet joints of L4/L5 and L5/S1. We will anticipate proceeding forward with a lumbar radiofrequency ablation and complement this with weight loss and core strength and stability. She underwent successful diagnostic lumbar medial branch blocks I set her up for confirmatory blocks at the same levels. Plan: The following treatment recommendations and plan were discussed in detail with Bev Hein. Imaging/Studies/Labs:   I have reviewed patients imaging of XR L-spine and results were discussed with patient today. Analgesics:   Patient is taking Acetaminophen. Patient informed that the maximum amount of acetaminophen taken on a regular basis should only be 4000 mg per day. Adjuvants:   None; patient wants to pursue injection therapy only. Interventions: In presence of lumbar axial back pain and with physical exam consistent for facetal pain, confirmatory medial branch blocks at bilateral L4/L5 and L5/S1 was chosen. The risks and benefits were discussed in detail with the patient.  Patient wants to proceed with the injection. Anticoagulation/NPO Recommendations:   Patient does not need to hold any medications prior to the procedure. Patient will need to be NPO x 8 hours prior to the procedure. Plan to start an IV prior to the procedure. Multidisciplinary Pain Management:   In the presence of complex, chronic, and multi-factorial pain, the importance of a multidisciplinary approach to pain management in the patients management regimen was emphasized and discussed in great detail.    PHYSICAL THERAPY: Patient is advised to continue gentle ROM stretching as discussed in clinic    Referrals:  None    Prescriptions Written This Visit:   None    Follow-up: For confirmatory lumbar MBBs      Rodri Randle DO  Interventional Pain Management/PM&R   [place]

## 2022-08-17 ENCOUNTER — HOSPITAL ENCOUNTER (OUTPATIENT)
Age: 72
Setting detail: OUTPATIENT SURGERY
Discharge: HOME | End: 2022-08-17
Attending: ANESTHESIOLOGY | Admitting: ANESTHESIOLOGY
Payer: MEDICARE

## 2022-08-17 ENCOUNTER — APPOINTMENT (OUTPATIENT)
Dept: GENERAL RADIOLOGY | Age: 72
End: 2022-08-17
Attending: ANESTHESIOLOGY
Payer: MEDICARE

## 2022-08-17 VITALS
WEIGHT: 189 LBS | BODY MASS INDEX: 33.49 KG/M2 | SYSTOLIC BLOOD PRESSURE: 109 MMHG | OXYGEN SATURATION: 92 % | TEMPERATURE: 97.3 F | HEART RATE: 58 BPM | RESPIRATION RATE: 16 BRPM | HEIGHT: 63 IN | DIASTOLIC BLOOD PRESSURE: 58 MMHG

## 2022-08-17 PROCEDURE — 7100000010 HC PHASE II RECOVERY - FIRST 15 MIN: Performed by: ANESTHESIOLOGY

## 2022-08-17 PROCEDURE — 99152 MOD SED SAME PHYS/QHP 5/>YRS: CPT | Performed by: ANESTHESIOLOGY

## 2022-08-17 PROCEDURE — 64493 INJ PARAVERT F JNT L/S 1 LEV: CPT | Performed by: ANESTHESIOLOGY

## 2022-08-17 PROCEDURE — 2500000003 HC RX 250 WO HCPCS: Performed by: ANESTHESIOLOGY

## 2022-08-17 PROCEDURE — 64494 INJ PARAVERT F JNT L/S 2 LEV: CPT | Performed by: ANESTHESIOLOGY

## 2022-08-17 PROCEDURE — 6360000002 HC RX W HCPCS: Performed by: ANESTHESIOLOGY

## 2022-08-17 PROCEDURE — 7100000011 HC PHASE II RECOVERY - ADDTL 15 MIN: Performed by: ANESTHESIOLOGY

## 2022-08-17 PROCEDURE — 2709999900 HC NON-CHARGEABLE SUPPLY: Performed by: ANESTHESIOLOGY

## 2022-08-17 PROCEDURE — 3209999900 FLUORO FOR SURGICAL PROCEDURES

## 2022-08-17 PROCEDURE — 3600000056 HC PAIN LEVEL 4 BASE: Performed by: ANESTHESIOLOGY

## 2022-08-17 RX ORDER — BUPIVACAINE HYDROCHLORIDE 2.5 MG/ML
INJECTION, SOLUTION EPIDURAL; INFILTRATION; INTRACAUDAL PRN
Status: DISCONTINUED | OUTPATIENT
Start: 2022-08-17 | End: 2022-08-17 | Stop reason: ALTCHOICE

## 2022-08-17 RX ORDER — MIDAZOLAM HYDROCHLORIDE 1 MG/ML
INJECTION INTRAMUSCULAR; INTRAVENOUS PRN
Status: DISCONTINUED | OUTPATIENT
Start: 2022-08-17 | End: 2022-08-17 | Stop reason: ALTCHOICE

## 2022-08-17 RX ORDER — LIDOCAINE HYDROCHLORIDE 10 MG/ML
INJECTION, SOLUTION EPIDURAL; INFILTRATION; INTRACAUDAL; PERINEURAL PRN
Status: DISCONTINUED | OUTPATIENT
Start: 2022-08-17 | End: 2022-08-17 | Stop reason: ALTCHOICE

## 2022-08-17 RX ORDER — FENTANYL CITRATE 50 UG/ML
INJECTION, SOLUTION INTRAMUSCULAR; INTRAVENOUS PRN
Status: DISCONTINUED | OUTPATIENT
Start: 2022-08-17 | End: 2022-08-17 | Stop reason: ALTCHOICE

## 2022-08-17 ASSESSMENT — PAIN - FUNCTIONAL ASSESSMENT: PAIN_FUNCTIONAL_ASSESSMENT: 0-10

## 2022-08-17 ASSESSMENT — PAIN SCALES - GENERAL: PAINLEVEL_OUTOF10: 0

## 2022-08-17 NOTE — PRE SEDATION
[facility]  Pre-Sedation/Analgesia History & Physical    Pt Name: Vida Herr  MRN: [id number]  YOB: 1950  Provider Performing Procedure: Truman Garcia DO   Primary Care Physician: Rolla Cowden, DO      MEDICAL HISTORY       has a past medical history of Arthritis and Hypertension. SURGICAL HISTORY   has a past surgical history that includes hernia repair (1990); Hysterectomy, total abdominal; parathyroidectomy (2015); and Pain management procedure (Bilateral, 7/26/2022). ALLERGIES   Allergies as of 08/09/2022 - Fully Reviewed 08/08/2022   Allergen Reaction Noted    Penicillins Hives 06/30/2016       MEDICATIONS   Prior to Admission medications    Medication Sig Start Date End Date Taking? Authorizing Provider   metoprolol succinate (TOPROL XL) 25 MG extended release tablet Take 25 mg by mouth at bedtime 6/27/22   Historical Provider, MD   Cholecalciferol (VITAMIN D3) 125 MCG (5000 UT) TABS Take by mouth    Historical Provider, MD   losartan (COZAAR) 50 MG tablet Take 50 mg by mouth daily  4/1/21   Historical Provider, MD     PHYSICAL:   Vitals:    08/17/22 1139   BP: (!) 109/58   Pulse: 58   Resp: 16   Temp: 97.3 °F (36.3 °C)   SpO2: 92%     PLANNED PROCEDURE   See procedure note  SEDATION  Planned agent: Versed and Fentanyl  ASA Classification: 2  Class 1: A normal healthy patient  Class 2: Pt with mild to moderate systemic disease  Class 3: Severe systemic disease or disturbance  Class 4: Severe systemic disorders that are already life threatening. Class 5: Moribund pt with little chances of survival, for more than 24 hours. Mallampati I Airway Classification: 2    1. Pre-procedure diagnostic studies complete and results available. 2. Previous sedation/anesthesia experiences assessed. 3. The patient is an appropriate candidate to undergo the planned procedure sedation and anesthesia. (Refer to nursing sedation/analgesia documentation record)  4.  Formulation and discussion of sedation/procedure plan, risks, and expectations with patient and/or responsible adult completed. 5. Patient examined immediately prior to the procedure.  (Refer to nursing sedation/analgesia documentation record)    Rigoberto De DO  Electronically signed 8/17/2022 at 1:40 PM

## 2022-08-17 NOTE — PROGRESS NOTES
1139 Received in Phase 2 . Drowsy responsive to verbal stimuli. Airway patent. O2 sat  92% Injection site clean and dry. Denies pain on arrival.  1143 Drink and snack given.   0 Assisted to sit at side of cart to get dressed  [id number] Discharged home via private car without complaint

## 2022-08-17 NOTE — POST SEDATION
[facility]  Sedation/Analgesia Post Sedation Record    Pt Name: Jesus Ly  MRN: [id number]  YOB: 1950  Procedure Performed By: Shubham Mora DO  Primary Care Physician: Jeremie Crocker DO    POST-PROCEDURE    Physicians/Assistants: Shubham Mora DO  Procedure Performed: See Procedure Note   Sedation/Anesthesia: Versed and Fentanyl (See procedure note for amount and duration)  Estimated Blood Loss:     0  ml  Specimens Removed: None        Complications: None           Alec Guajardo DO  Electronically signed 8/17/2022 at 1:40 PM

## 2022-09-01 ENCOUNTER — OFFICE VISIT (OUTPATIENT)
Dept: PHYSICAL MEDICINE AND REHAB | Age: 72
End: 2022-09-01
Payer: MEDICARE

## 2022-09-01 VITALS
WEIGHT: 189 LBS | SYSTOLIC BLOOD PRESSURE: 116 MMHG | BODY MASS INDEX: 33.49 KG/M2 | HEIGHT: 63 IN | DIASTOLIC BLOOD PRESSURE: 68 MMHG

## 2022-09-01 DIAGNOSIS — M54.50 CHRONIC BILATERAL LOW BACK PAIN WITHOUT SCIATICA: ICD-10-CM

## 2022-09-01 DIAGNOSIS — G89.29 CHRONIC BILATERAL LOW BACK PAIN WITHOUT SCIATICA: ICD-10-CM

## 2022-09-01 DIAGNOSIS — M47.816 LUMBAR SPONDYLOSIS: Primary | ICD-10-CM

## 2022-09-01 DIAGNOSIS — G89.29 OTHER CHRONIC PAIN: ICD-10-CM

## 2022-09-01 DIAGNOSIS — M79.18 MYOFASCIAL PAIN: ICD-10-CM

## 2022-09-01 PROCEDURE — 1123F ACP DISCUSS/DSCN MKR DOCD: CPT | Performed by: ANESTHESIOLOGY

## 2022-09-01 PROCEDURE — 99214 OFFICE O/P EST MOD 30 MIN: CPT | Performed by: ANESTHESIOLOGY

## 2022-09-01 NOTE — PROGRESS NOTES
Chronic Pain/PM&R Clinic Note     Encounter Date: 9/1/22     Subjective:   Chief Complaint:   Chief Complaint   Patient presents with    Follow Up After Procedure     medial branch blocks bilateral Lumbar4/5 and 5/Sacral 1 8/17/22       History of Present Illness:   Chen Avila is a 81 y.o. female seen in the clinic initially on 05/16/22 upon request from [address] [place] [address]  (PCP) for her history of chronic low back pain. She states that she has been dealing with low back pain for several years without inciting event. She describes majority of her back pain to be in the small of her low back. She describes this as a constant aching 4/10 pain. She also has some pain that radiates to her left buttocks and down her right thigh at times. These are more intermittent pains for her. She denies any pain that radiates further past her knee, associated leg weakness, leg numbness/tingling, or bowel/bladder incontinence episodes. She states that her pain is worse with activities that involve standing on her feet for extended duration of time, walking, bending, or trying to ride her horse. She states her pain is alleviated with rest, sitting, and medications. 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    8/8/2022, patient presents for planned follow-up for chronic low back pain. She underwent diagnostic lumbar medial branch blocks on 7/26/2022. She reports 100% relief lasting 3 days in duration. She states that she was able to stand upright without any low back pain during this timeframe. She states that she did not to take any breakthrough Tylenol as well. She would like to proceed with the confirmatory steps to her injection series. She denies any other new symptoms this point. Today, 9/1/2022, patient presents for planned follow-up for chronic low back pain. She underwent confirmatory medial branch blocks on 8/17/2022.   She reports greater than 90% relief lasting 3 days in duration. She states that she had improved ability to stand upright and ambulate without as much low back pain. She states that she would like to proceed with the radiofrequency ablation as previously discussed. She denies any other new symptoms this point. History of Interventions:   Surgery: No previous lumbar surgeries  Injections: Diagnostic lumbar MBBs (7/26/22) - 100% relief x 3 days   Confirmatory lumbar MBBs (8/17/22) - >90% relief x 3 days     Current Treatment Medications:   Tylenol PRN    Historical Treatment Medications:   None    Imaging/Studies:  XR L-spine (4/4/22)       Past Medical History:   Diagnosis Date    Arthritis     Hypertension        Past Surgical History:   Procedure Laterality Date    HERNIA REPAIR  1990    HYSTERECTOMY, TOTAL ABDOMINAL (CERVIX REMOVED)      2000    PAIN MANAGEMENT PROCEDURE Bilateral 7/26/2022    LUMBAR FACET medial branch blocks bilateral Lumbar 4 5  5 Sacral 1 performed by Abhinav Burleson DO at [facility] Bilateral 8/17/2022    medial branch blocks bilateral Lumbar4/5 and 5/Sacral 1 performed by Abhinav Burleson DO at [address] [place] [address]  2015       No family history on file.       Medications & Allergies:   Current Outpatient Medications   Medication Instructions    Cholecalciferol (VITAMIN D3) 125 MCG (5000 UT) TABS Oral    losartan (COZAAR) 50 mg, Oral, DAILY    metoprolol succinate (TOPROL XL) 25 mg, Oral, Nightly       Allergies   Allergen Reactions    Penicillins Hives       Review of Systems:   Constitutional: negative for weight changes or fevers  Genitourinary: negative for bowel/bladder incontinence   Musculoskeletal: positive for low back pain  Neurological: negative for any leg weakness or numbness/tingling  Behavioral/Psych: negative for anxiety/depression   All other systems reviewed and are negative    Objective:     Vitals:    09/01/22 1524   BP: 116/68     Constitutional: Pleasant, no acute distress   Head: Normocephalic, atraumatic   Eyes: Conjunctivae normal   Neck: Supple, symmetrical   Lungs: Normal respiratory effort, non-labored breathing   Cardiovascular: Limbs warm and well perfused   Abdomen: Non-protruded   Musculoskeletal: Muscle bulk symmetric, no atrophy, no gross deformities    · Lumbar Spine: ROM reduced in extension. Lumbar paraspinals tender to palpation bilaterally. SLR neg bilaterally. SAVANNA neg bilaterally. Positive facet loading bilaterally. Bilateral SI joints non-tender to palpation. Neurological: Cranial nerves II-XII grossly intact. · Gait - Antalgic gait. Ambulates without assistive device. · Motor: No focal motor deficits in lower limbs  Skin: No rashes or lesions visible in low back  Psychological: Cooperative, no exaggerated pain behaviors       Assessment:    Diagnosis Orders   1. Lumbar spondylosis  CHG FLUOR NEEDLE/CATH SPINE/PARASPINAL DX/THER ADDON    AL RADIOFREQUENCY NEUROTOMY LUMBAR OR SACRAL, W IMAGE GUIDANCE, SINGLE    AL RADIOFREQ NEUROTOMY LUMBAR OR SACRAL, W IMAGE GUIDE,EA ADDL LEVEL      2. Myofascial pain        3. Other chronic pain        4. Chronic bilateral low back pain without sciatica                Nikolas Najera is a 81 y. o.female presenting to the pain clinic for evaluation of chronic low back pain. Her clinical history and physical lamination are consistent with lumbar facet mediated pain secondary to lumbar spondylosis and degenerative disc disease. I set patient up for diagnostic lumbar medial branch block starting the bilateral facet joints of L4/L5 and L5/S1. We will anticipate proceeding forward with a lumbar radiofrequency ablation and complement this with weight loss and core strength and stability. She underwent successful diagnostic and confirmatory medial branch blocks. I have set her up for thermal radiofrequency ablation targeting these levels. Plan:    The following treatment recommendations and plan were discussed in detail with Bev Hein. Imaging/Studies/Labs:   I have reviewed patients imaging of XR L-spine and results were discussed with patient today. Analgesics:   Patient is taking Acetaminophen. Patient informed that the maximum amount of acetaminophen taken on a regular basis should only be 4000 mg per day. Adjuvants:   None; patient wants to pursue injection therapy only. Interventions: In presence of lumbar axial back pain and with physical exam consistent for facetal pain, thermal radiofrequency ablation targeting medial branches of bilateral L4/L5 and L5/S1 was chosen. The risks and benefits were discussed in detail with the patient. Patient wants to proceed with the injection. Anticoagulation/NPO Recommendations:   Patient does not need to hold any medications prior to the procedure. Patient will need to be NPO x 8 hours prior to the procedure. Plan to start an IV prior to the procedure. Multidisciplinary Pain Management:   In the presence of complex, chronic, and multi-factorial pain, the importance of a multidisciplinary approach to pain management in the patients management regimen was emphasized and discussed in great detail.    PHYSICAL THERAPY: Patient is advised to continue gentle ROM stretching as discussed in clinic    Referrals:  None    Prescriptions Written This Visit:   None    Follow-up: For B/L lumbar RFA      Odilia Rodriguez,   Interventional Pain Management/PM&R   [place]

## 2022-09-16 ENCOUNTER — TELEPHONE (OUTPATIENT)
Dept: PHYSICAL MEDICINE AND REHAB | Age: 72
End: 2022-09-16

## 2022-09-16 NOTE — TELEPHONE ENCOUNTER
Kostas peer to peer request received. Spoke with Isidoro Peer to Peer schedule today 9/16/2022 @ 12:30pm with Dr. Melly Johnson. He will call you phone number.   Thanks

## 2022-09-19 ENCOUNTER — PREP FOR PROCEDURE (OUTPATIENT)
Dept: PHYSICAL MEDICINE AND REHAB | Age: 72
End: 2022-09-19

## 2022-09-19 NOTE — DISCHARGE INSTRUCTIONS

## 2022-09-19 NOTE — H&P
Today, patient presents for planned thermal radiofrequency ablation targeting medial branches of bilateral L4/L5 and L5/S1    This note is reflective of the patient's previous visit for evaluation. We will proceed with today's planned procedure. Since patient's last visit for evaluation, there have been no interval changes in medical history. Patient has no new numbness, weakness, or focal neurological deficit since evaluation. Patient has no contraindications to injection (no anticoagulation or recent antibiotic intake for active infections), and has a  present or is able to drive themselves (as discussed and cleared by physician). Allergies to latex, contrast dye, and steroid medications have been confirmed with the patient prior to the procedure. NPO necessity has been assessed and accepted based on procedure complexity. The risks and benefits of the procedure have been explained including but are not limited to infection, bleeding, paralysis, immediate post procedure weakness, and dizziness; the patient acknowledges understanding and desires to proceed with the procedure. Patient has signed consent for same procedure as discussed in previous clinic encounter. All other questions and concerns were addressed at bedside. See procedure note for full details. Post procedure Instructions: The patient was advised not to drive during the day of the procedure and not to engage in any significant decision making (unless otherwise states by physician). The patient was also advised to be cautious with walking/activity for 24 hours following today's visit and asked not to engage in over-exertion (unless otherwise states by physician). After this time, it is ok to resume pre-procedure level of activity. Patient advised to apply ice to site of injection in situations of pain and discomfort. Patient advised to not submerge site of injection during bath or pool activities for approximately 24 hours post-procedure. Patient attested to understanding post procedure directions / restrictions. All other questions and concerns addressed before patient discharge in ambulatory fashion. Chronic Pain/PM&R Clinic Note     Encounter Date: 9/1/22     Subjective:   Chief Complaint:   No chief complaint on file. History of Present Illness:   Danielito Oliver is a 81 y.o. female seen in the clinic initially on 05/16/22 upon request from [address] [place] [address]  (PCP) for her history of chronic low back pain. She states that she has been dealing with low back pain for several years without inciting event. She describes majority of her back pain to be in the small of her low back. She describes this as a constant aching 4/10 pain. She also has some pain that radiates to her left buttocks and down her right thigh at times. These are more intermittent pains for her. She denies any pain that radiates further past her knee, associated leg weakness, leg numbness/tingling, or bowel/bladder incontinence episodes. She states that her pain is worse with activities that involve standing on her feet for extended duration of time, walking, bending, or trying to ride her horse. She states her pain is alleviated with rest, sitting, and medications. 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    8/8/2022, patient presents for planned follow-up for chronic low back pain. She underwent diagnostic lumbar medial branch blocks on 7/26/2022. She reports 100% relief lasting 3 days in duration. She states that she was able to stand upright without any low back pain during this timeframe. She states that she did not to take any breakthrough Tylenol as well. She would like to proceed with the confirmatory steps to her injection series. She denies any other new symptoms this point. Today, 9/1/2022, patient presents for planned follow-up for chronic low back pain.   She underwent confirmatory medial branch blocks on 8/17/2022. She reports greater than 90% relief lasting 3 days in duration. She states that she had improved ability to stand upright and ambulate without as much low back pain. She states that she would like to proceed with the radiofrequency ablation as previously discussed. She denies any other new symptoms this point. History of Interventions:   Surgery: No previous lumbar surgeries  Injections: Diagnostic lumbar MBBs (7/26/22) - 100% relief x 3 days   Confirmatory lumbar MBBs (8/17/22) - >90% relief x 3 days     Current Treatment Medications:   Tylenol PRN    Historical Treatment Medications:   None    Imaging/Studies:  XR L-spine (4/4/22)       Past Medical History:   Diagnosis Date    Arthritis     Hypertension        Past Surgical History:   Procedure Laterality Date    HERNIA REPAIR  1990    HYSTERECTOMY, TOTAL ABDOMINAL (CERVIX REMOVED)      2000    PAIN MANAGEMENT PROCEDURE Bilateral 7/26/2022    LUMBAR FACET medial branch blocks bilateral Lumbar 4 5  5 Sacral 1 performed by Zainab Arnold DO at [facility] Bilateral 8/17/2022    medial branch blocks bilateral Lumbar4/5 and 5/Sacral 1 performed by Zainab Arnold DO at [address] [place] [address]  2015       No family history on file.       Medications & Allergies:   Current Outpatient Medications   Medication Instructions    Cholecalciferol (VITAMIN D3) 125 MCG (5000 UT) TABS Oral    losartan (COZAAR) 50 mg, Oral, DAILY    metoprolol succinate (TOPROL XL) 25 mg, Oral, Nightly       Allergies   Allergen Reactions    Penicillins Hives       Review of Systems:   Constitutional: negative for weight changes or fevers  Genitourinary: negative for bowel/bladder incontinence   Musculoskeletal: positive for low back pain  Neurological: negative for any leg weakness or numbness/tingling  Behavioral/Psych: negative for anxiety/depression   All other systems reviewed and are negative    Objective: There were no vitals filed for this visit. Constitutional: Pleasant, no acute distress   Head: Normocephalic, atraumatic   Eyes: Conjunctivae normal   Neck: Supple, symmetrical   Lungs: Normal respiratory effort, non-labored breathing   Cardiovascular: Limbs warm and well perfused   Abdomen: Non-protruded   Musculoskeletal: Muscle bulk symmetric, no atrophy, no gross deformities    · Lumbar Spine: ROM reduced in extension. Lumbar paraspinals tender to palpation bilaterally. SLR neg bilaterally. SAVANNA neg bilaterally. Positive facet loading bilaterally. Bilateral SI joints non-tender to palpation. Neurological: Cranial nerves II-XII grossly intact. · Gait - Antalgic gait. Ambulates without assistive device. · Motor: No focal motor deficits in lower limbs  Skin: No rashes or lesions visible in low back  Psychological: Cooperative, no exaggerated pain behaviors       Assessment:    Diagnosis Orders   1. Lumbar spondylosis  CHG FLUOR NEEDLE/CATH SPINE/PARASPINAL DX/THER ADDON    CT RADIOFREQUENCY NEUROTOMY LUMBAR OR SACRAL, W IMAGE GUIDANCE, SINGLE    CT RADIOFREQ NEUROTOMY LUMBAR OR SACRAL, W IMAGE GUIDE,EA ADDL LEVEL      2. Myofascial pain        3. Other chronic pain        4. Chronic bilateral low back pain without sciatica                Bryan Morrell is a 81 y. o.female presenting to the pain clinic for evaluation of chronic low back pain. Her clinical history and physical lamination are consistent with lumbar facet mediated pain secondary to lumbar spondylosis and degenerative disc disease. I set patient up for diagnostic lumbar medial branch block starting the bilateral facet joints of L4/L5 and L5/S1. We will anticipate proceeding forward with a lumbar radiofrequency ablation and complement this with weight loss and core strength and stability. She underwent successful diagnostic and confirmatory medial branch blocks.   I have set her up for thermal radiofrequency ablation targeting these

## 2022-09-20 ENCOUNTER — HOSPITAL ENCOUNTER (OUTPATIENT)
Age: 72
Setting detail: OUTPATIENT SURGERY
Discharge: HOME | End: 2022-09-20
Attending: ANESTHESIOLOGY | Admitting: ANESTHESIOLOGY
Payer: MEDICARE

## 2022-09-20 ENCOUNTER — APPOINTMENT (OUTPATIENT)
Dept: GENERAL RADIOLOGY | Age: 72
End: 2022-09-20
Attending: ANESTHESIOLOGY
Payer: MEDICARE

## 2022-09-20 VITALS
OXYGEN SATURATION: 96 % | DIASTOLIC BLOOD PRESSURE: 57 MMHG | WEIGHT: 189 LBS | HEART RATE: 63 BPM | RESPIRATION RATE: 16 BRPM | BODY MASS INDEX: 34.78 KG/M2 | SYSTOLIC BLOOD PRESSURE: 95 MMHG | TEMPERATURE: 97.4 F | HEIGHT: 62 IN

## 2022-09-20 PROCEDURE — 64636 DESTROY L/S FACET JNT ADDL: CPT | Performed by: ANESTHESIOLOGY

## 2022-09-20 PROCEDURE — 64635 DESTROY LUMB/SAC FACET JNT: CPT | Performed by: ANESTHESIOLOGY

## 2022-09-20 PROCEDURE — 3209999900 FLUORO FOR SURGICAL PROCEDURES

## 2022-09-20 PROCEDURE — 6360000002 HC RX W HCPCS: Performed by: ANESTHESIOLOGY

## 2022-09-20 PROCEDURE — 99153 MOD SED SAME PHYS/QHP EA: CPT | Performed by: ANESTHESIOLOGY

## 2022-09-20 PROCEDURE — 3600000056 HC PAIN LEVEL 4 BASE: Performed by: ANESTHESIOLOGY

## 2022-09-20 PROCEDURE — 99152 MOD SED SAME PHYS/QHP 5/>YRS: CPT | Performed by: ANESTHESIOLOGY

## 2022-09-20 PROCEDURE — 2500000003 HC RX 250 WO HCPCS: Performed by: ANESTHESIOLOGY

## 2022-09-20 PROCEDURE — 7100000010 HC PHASE II RECOVERY - FIRST 15 MIN: Performed by: ANESTHESIOLOGY

## 2022-09-20 PROCEDURE — 3600000057 HC PAIN LEVEL 4 ADDL 15 MIN: Performed by: ANESTHESIOLOGY

## 2022-09-20 PROCEDURE — 2709999900 HC NON-CHARGEABLE SUPPLY: Performed by: ANESTHESIOLOGY

## 2022-09-20 PROCEDURE — 7100000011 HC PHASE II RECOVERY - ADDTL 15 MIN: Performed by: ANESTHESIOLOGY

## 2022-09-20 RX ORDER — LIDOCAINE HYDROCHLORIDE 20 MG/ML
INJECTION, SOLUTION EPIDURAL; INFILTRATION; INTRACAUDAL; PERINEURAL PRN
Status: DISCONTINUED | OUTPATIENT
Start: 2022-09-20 | End: 2022-09-20 | Stop reason: ALTCHOICE

## 2022-09-20 RX ORDER — MIDAZOLAM HYDROCHLORIDE 1 MG/ML
INJECTION INTRAMUSCULAR; INTRAVENOUS PRN
Status: DISCONTINUED | OUTPATIENT
Start: 2022-09-20 | End: 2022-09-20 | Stop reason: ALTCHOICE

## 2022-09-20 RX ORDER — LIDOCAINE HYDROCHLORIDE 10 MG/ML
INJECTION, SOLUTION EPIDURAL; INFILTRATION; INTRACAUDAL; PERINEURAL PRN
Status: DISCONTINUED | OUTPATIENT
Start: 2022-09-20 | End: 2022-09-20 | Stop reason: ALTCHOICE

## 2022-09-20 RX ORDER — FENTANYL CITRATE 50 UG/ML
INJECTION, SOLUTION INTRAMUSCULAR; INTRAVENOUS PRN
Status: DISCONTINUED | OUTPATIENT
Start: 2022-09-20 | End: 2022-09-20 | Stop reason: ALTCHOICE

## 2022-09-20 ASSESSMENT — PAIN - FUNCTIONAL ASSESSMENT: PAIN_FUNCTIONAL_ASSESSMENT: 0-10

## 2022-09-20 NOTE — PRE SEDATION
sedation/analgesia documentation record)  4. Formulation and discussion of sedation/procedure plan, risks, and expectations with patient and/or responsible adult completed. 5. Patient examined immediately prior to the procedure.  (Refer to nursing sedation/analgesia documentation record)    William Carbajal DO  Electronically signed 9/20/2022 at 1:44 PM

## 2022-09-20 NOTE — PROCEDURES
Pre-operative Diagnosis: Lumbar facet pain     Post-operative Diagnosis: Lumbar facet pain     Procedure: BILATERAL lumbar thermal radiofrequency ablation targeting facet joints L4/L5 and L5/S1    Procedure Description:  After consent was obtained, the patient was placed in the prone position with a pillow under the abdomen to reduce the lordotic curve of the lumbar spine. The lower back was prepped with chloraprep and draped in a sterile fashion. Then, 0.5 cc of 1 % lidocaine was used for local anesthesia of the skin and superficial subcutaneous tissues. Three 20-gauge 100mm SMK cannulas with 10-mm active tips were advanced under fluoroscopic guidance in an AP view to the junction of the right superior articular process and the transverse process of the L4 and L5 vertebra and at the sacral ala. There were no paresthesias, heme or CSF obtained. Needle placement was confirmed using AP and oblique views. This procedure was repeated on the contralateral side. Sensory and motor stimulation at 50Hz and 2Hz and impedance measurements were carried out having reached threshold at:     RIGHT  L4: 0.2V/3V/150-300 Ohms  L5: 0.2V/3V/150-300 Ohms  SA: 0.2V/3V/150-300 Ohms     LEFT  L4: 0.2V/3V/150-300 Ohms  L5: 0.2V/3V/150-300 Ohms  SA: 0.2V/3V/150-300 Ohms        Then, 1cc of 2% Lidocaine was injected at the site. Temperature was then raised to 80 degrees centigrade for 90 seconds with a 15 second temperature ramp. No pain was reported during the lesioning. The needles were then withdrawn without complications. The patient tolerated the procedure well. The patient was transported to the recovery room and discharged in ambulatory fashion.     Procedural Complications: None  Estimated Blood Loss: 0 mL    IV sedation was used during the procedure:  - Moderate intravenous conscious sedation was supervised by Dr. Dariusz Chino  - The patient was independently monitored by a Registered Nurse assigned to the procedure room  - Monitoring included automated blood pressure, continuous EKG, and continuous pulse oximetry  - The detailed conscious record is permanently stored in the [place] [id number]  - The following is the conscious sedation record:  Start Time: 11:26  End Time : 11:41  Duration: 15 minutes   Medications Administered: 1 mg Versed, 50 mcg Fentanyl        Alec Rodriguez DO  Interventional Pain Management/PM&R   [place]

## 2022-09-20 NOTE — PROGRESS NOTES
1144: Patient arrived to Phase II Recovery via cart. Awake and alert. Report received from Charity Caraballo, [address]: VSS, patient denies pain. Denies snack and drink. Call light placed within reach. 1205: IV removed without complications. Band aid applied to site. Patient sat edge of bed without difficulty and dressed independently in room. 1213: Patient ambulatory to vehicle and discharged home in stable condition with .

## 2022-09-20 NOTE — POST SEDATION
[address] [address]  Sedation/Analgesia Post Sedation Record    Pt Name: Delilah Beck  MRN: [id number]  YOB: 1950  Procedure Performed By: Jayant Eldridge DO  Primary Care Physician: Palma Flanagan DO    POST-PROCEDURE    Physicians/Assistants: Jayant Eldridge DO  Procedure Performed: See Procedure Note   Sedation/Anesthesia: Versed and Fentanyl (See procedure note for amount and duration)  Estimated Blood Loss:     0  ml  Specimens Removed: None        Complications: None           Alec Molina DO  Electronically signed 9/20/2022 at 1:45 PM

## 2022-09-20 NOTE — PROGRESS NOTES
Pt. Admitted in stable condition. Consent signed. VS obtained and WNL. INT inserted without complications. Discharge instructions reviewed with the pt. Pt. Denies questions. AVS given to pt. Pt. Denies all other needs. Warm blanket provided. Call light left within reach.

## 2022-12-29 ENCOUNTER — HOSPITAL ENCOUNTER (OUTPATIENT)
Dept: GENERAL RADIOLOGY | Age: 72
Discharge: HOME | End: 2022-12-29
Payer: MEDICARE

## 2022-12-29 ENCOUNTER — OFFICE VISIT (OUTPATIENT)
Dept: PHYSICAL MEDICINE AND REHAB | Age: 72
End: 2022-12-29
Payer: MEDICARE

## 2022-12-29 ENCOUNTER — HOSPITAL ENCOUNTER (OUTPATIENT)
Age: 72
Discharge: HOME | End: 2022-12-29
Payer: MEDICARE

## 2022-12-29 VITALS
WEIGHT: 189 LBS | DIASTOLIC BLOOD PRESSURE: 66 MMHG | BODY MASS INDEX: 34.78 KG/M2 | SYSTOLIC BLOOD PRESSURE: 126 MMHG | HEIGHT: 62 IN

## 2022-12-29 DIAGNOSIS — M79.18 MYOFASCIAL PAIN: ICD-10-CM

## 2022-12-29 DIAGNOSIS — M47.816 LUMBAR SPONDYLOSIS: ICD-10-CM

## 2022-12-29 DIAGNOSIS — G89.29 OTHER CHRONIC PAIN: ICD-10-CM

## 2022-12-29 DIAGNOSIS — M54.17 RADICULOPATHY, LUMBOSACRAL REGION: ICD-10-CM

## 2022-12-29 DIAGNOSIS — M47.816 LUMBAR SPONDYLOSIS: Primary | ICD-10-CM

## 2022-12-29 PROCEDURE — 72110 X-RAY EXAM L-2 SPINE 4/>VWS: CPT

## 2022-12-29 PROCEDURE — 1123F ACP DISCUSS/DSCN MKR DOCD: CPT | Performed by: ANESTHESIOLOGY

## 2022-12-29 PROCEDURE — 99214 OFFICE O/P EST MOD 30 MIN: CPT | Performed by: ANESTHESIOLOGY

## 2022-12-29 NOTE — PROGRESS NOTES
Chronic Pain/PM&R Clinic Note     Encounter Date: 12/29/22     Subjective:   Chief Complaint:   Chief Complaint   Patient presents with    Follow Up After Procedure     radiofrequency ablation targeting medial branches of bilateral Lumbar 4/5,Lumbar5/Sacral 1  9/20/22       History of Present Illness:   Ja Chris is a 81 y.o. female seen in the clinic initially on 05/16/22 upon request from [address] [place] [address]  (PCP) for her history of chronic low back pain. She states that she has been dealing with low back pain for several years without inciting event. She describes majority of her back pain to be in the small of her low back. She describes this as a constant aching 4/10 pain. She also has some pain that radiates to her left buttocks and down her right thigh at times. These are more intermittent pains for her. She denies any pain that radiates further past her knee, associated leg weakness, leg numbness/tingling, or bowel/bladder incontinence episodes. She states that her pain is worse with activities that involve standing on her feet for extended duration of time, walking, bending, or trying to ride her horse. She states her pain is alleviated with rest, sitting, and medications. 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 Today, 12/29/2022, patient presents for planned follow-up for chronic low back and leg pain. She completed her lumbar radiofrequency ablation on 9/20/2022. She reports some improvement in her low back pain but continues to have pain radiating down to her anterior thighs. She states that she notices pain when she is bending forward or doing any yard work with excessive bending. She is wondering why this has gotten worse since completing her ablation therapy. She denies any associated leg weakness, numbness/tingling, worsening gait disturbance or bowel/bladder dysfunction.       History of Interventions:   Surgery: No previous lumbar surgeries  Injections: Diagnostic lumbar MBBs (7/26/22) - 100% relief x 3 days   Confirmatory lumbar MBBs (8/17/22) - >90% relief x 3 days   Lumbar RFA (9/20/2022)    Current Treatment Medications:   Tylenol PRN    Historical Treatment Medications:   None    Imaging/Studies:  XR L-spine (4/4/22)       Past Medical History:   Diagnosis Date    Arthritis     Hypertension        Past Surgical History:   Procedure Laterality Date    HERNIA REPAIR  1990    HYSTERECTOMY, TOTAL ABDOMINAL (CERVIX REMOVED)      2000    PAIN MANAGEMENT PROCEDURE Bilateral 7/26/2022    LUMBAR FACET medial branch blocks bilateral Lumbar 4 5  5 Sacral 1 performed by Melissa Veliz DO at [facility] Bilateral 8/17/2022    medial branch blocks bilateral Lumbar4/5 and 5/Sacral 1 performed by Melissa Veliz DO at [facility] Bilateral 9/20/2022    radiofrequency ablation targeting medial branches of bilateral Lumbar 4/5,Lumbar5/Sacral 1 performed by Melissa Veliz DO at [address] [place] [address]  2015       No family history on file.       Medications & Allergies:   Current Outpatient Medications   Medication Instructions    Cholecalciferol (VITAMIN D3) 125 MCG (5000 UT) TABS Oral    losartan (COZAAR) 50 mg, Oral, DAILY    metoprolol succinate (TOPROL XL) 25 mg, Oral, Nightly       Allergies   Allergen Reactions    Penicillins Hives       Review of Systems:   Constitutional: negative for weight changes or fevers  Genitourinary: negative for bowel/bladder incontinence   Musculoskeletal: positive for low back pain  Neurological: negative for any leg weakness or numbness/tingling  Behavioral/Psych: negative for anxiety/depression   All other systems reviewed and are negative    Objective:     Vitals:    12/29/22 1322   BP: 126/66     Constitutional: Pleasant, no acute distress   Head: Normocephalic, atraumatic   Eyes: Conjunctivae normal   Neck: Supple, symmetrical   Lungs: Normal respiratory effort, non-labored breathing   Cardiovascular: Limbs warm and well perfused   Abdomen: Non-protruded   Musculoskeletal: Muscle bulk symmetric, no atrophy, no gross deformities    · Lumbar Spine: ROM reduced in extension. Lumbar paraspinals tender to palpation bilaterally. SLR neg bilaterally. SAVANNA neg bilaterally. Positive facet loading bilaterally. Bilateral SI joints non-tender to palpation. Neurological: Cranial nerves II-XII grossly intact. · Gait - Antalgic gait. Ambulates without assistive device. · Motor: No focal motor deficits in lower limbs  Skin: No rashes or lesions visible in low back  Psychological: Cooperative, no exaggerated pain behaviors       Assessment:    Diagnosis Orders   1. Lumbar spondylosis  XR LUMBAR SPINE (MIN 4 VIEWS)      2. Myofascial pain        3. Other chronic pain        4. Radiculopathy, lumbosacral region                  Cindi Carbajal is a 81 y. o.female presenting to the pain clinic for evaluation of chronic low back pain. Her clinical history and physical lamination are consistent with lumbar facet mediated pain secondary to lumbar spondylosis and degenerative disc disease. I set patient up for diagnostic lumbar medial branch block starting the bilateral facet joints of L4/L5 and L5/S1. We will anticipate proceeding forward with a lumbar radiofrequency ablation and complement this with weight loss and core strength and stability. She has completed her lumbar radiofrequency ablation but has noticed worsening pain in her thighs and could have worsening listhesis in her upper lumbar spine causing some new symptoms. I have ordered flex/ex lumbar spine imaging to further evaluate and may set her up for a lumbar epidural steroid injection. Plan: The following treatment recommendations and plan were discussed in detail with Bev Hein.     Imaging/Studies/Labs:   I have reviewed patients imaging of XR L-spine and results were discussed with patient today. Analgesics:   Patient is taking Acetaminophen. Patient informed that the maximum amount of acetaminophen taken on a regular basis should only be 4000 mg per day. Adjuvants:   None; patient wants to pursue injection therapy only. Interventions:   None    Anticoagulation/NPO Recommendations:   None    Multidisciplinary Pain Management:   In the presence of complex, chronic, and multi-factorial pain, the importance of a multidisciplinary approach to pain management in the patients management regimen was emphasized and discussed in great detail.    PHYSICAL THERAPY: Patient is advised to continue gentle ROM stretching as discussed in clinic    Referrals:  None    Prescriptions Written This Visit:   None    Follow-up: Pending lumbar spine XR results      Ori Arreola DO  Interventional Pain Management/PM&R   [place]

## 2022-12-30 ENCOUNTER — TELEPHONE (OUTPATIENT)
Dept: PHYSICAL MEDICINE AND REHAB | Age: 72
End: 2022-12-30

## 2022-12-30 DIAGNOSIS — M54.50 CHRONIC BILATERAL LOW BACK PAIN WITHOUT SCIATICA: ICD-10-CM

## 2022-12-30 DIAGNOSIS — G89.29 CHRONIC BILATERAL LOW BACK PAIN WITHOUT SCIATICA: ICD-10-CM

## 2022-12-30 DIAGNOSIS — M54.17 RADICULOPATHY, LUMBOSACRAL REGION: ICD-10-CM

## 2022-12-30 DIAGNOSIS — M79.18 MYOFASCIAL PAIN: ICD-10-CM

## 2022-12-30 DIAGNOSIS — M47.816 LUMBAR SPONDYLOSIS: Primary | ICD-10-CM

## 2022-12-30 DIAGNOSIS — G89.29 OTHER CHRONIC PAIN: ICD-10-CM

## 2022-12-30 NOTE — TELEPHONE ENCOUNTER
Set up for a lumbar epidural steroid inj at L2/L3. Patient does not need to hold any medications prior to the procedure. Patient will need to be NPO x 8 hours prior to the procedure. Plan to start an IV prior to the procedure  Order entered.

## 2023-01-12 ENCOUNTER — PREP FOR PROCEDURE (OUTPATIENT)
Dept: PHYSICAL MEDICINE AND REHAB | Age: 73
End: 2023-01-12

## 2023-01-16 NOTE — DISCHARGE INSTRUCTIONS

## 2023-01-16 NOTE — H&P
Today, patient presents for planned lumbar epidural steroid injection at L2/L3    This note is reflective of the patient's previous visit for evaluation. We will proceed with today's planned procedure. Since patient's last visit for evaluation, there have been no interval changes in medical history. Patient has no new numbness, weakness, or focal neurological deficit since evaluation. Patient has no contraindications to injection (no anticoagulation or recent antibiotic intake for active infections), and has a  present or is able to drive themselves (as discussed and cleared by physician).  Allergies to latex, contrast dye, and steroid medications have been confirmed with the patient prior to the procedure.  NPO necessity has been assessed and accepted based on procedure complexity. The risks and benefits of the procedure have been explained including but are not limited to infection, bleeding, paralysis, immediate post procedure weakness, and dizziness; the patient acknowledges understanding and desires to proceed with the procedure. Patient has signed consent for same procedure as discussed in previous clinic encounter. All other questions and concerns were addressed at bedside. See procedure note for full details.       Post procedure Instructions: The patient was advised not to drive during the day of the procedure and not to engage in any significant decision making (unless otherwise states by physician). The patient was also advised to be cautious with walking/activity for 24 hours following today's visit and asked not to engage in over-exertion (unless otherwise states by physician). After this time, it is ok to resume pre-procedure level of activity. Patient advised to apply ice to site of injection in situations of pain and discomfort. Patient advised to not submerge site of injection during bath or pool activities for approximately 24 hours post-procedure. Patient attested to understanding post  procedure directions / restrictions. All other questions and concerns addressed before patient discharge in ambulatory fashion. Chronic Pain/PM&R Clinic Note     Encounter Date: 12/29/22     Subjective:   Chief Complaint:   No chief complaint on file. History of Present Illness:   Darby Eagle is a 81 y.o. female seen in the clinic initially on 05/16/22 upon request from [address] [place] [address]  (PCP) for her history of chronic low back pain. She states that she has been dealing with low back pain for several years without inciting event. She describes majority of her back pain to be in the small of her low back. She describes this as a constant aching 4/10 pain. She also has some pain that radiates to her left buttocks and down her right thigh at times. These are more intermittent pains for her. She denies any pain that radiates further past her knee, associated leg weakness, leg numbness/tingling, or bowel/bladder incontinence episodes. She states that her pain is worse with activities that involve standing on her feet for extended duration of time, walking, bending, or trying to ride her horse. She states her pain is alleviated with rest, sitting, and medications. 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 Today, 12/29/2022, patient presents for planned follow-up for chronic low back and leg pain. She completed her lumbar radiofrequency ablation on 9/20/2022. She reports some improvement in her low back pain but continues to have pain radiating down to her anterior thighs. She states that she notices pain when she is bending forward or doing any yard work with excessive bending. She is wondering why this has gotten worse since completing her ablation therapy. She denies any associated leg weakness, numbness/tingling, worsening gait disturbance or bowel/bladder dysfunction.       History of Interventions:   Surgery: No previous lumbar surgeries  Injections: Diagnostic lumbar MBBs (7/26/22) - 100% relief x 3 days   Confirmatory lumbar MBBs (8/17/22) - >90% relief x 3 days   Lumbar RFA (9/20/2022)    Current Treatment Medications:   Tylenol PRN    Historical Treatment Medications:   None    Imaging/Studies:  XR L-spine (4/4/22)       Past Medical History:   Diagnosis Date    Arthritis     Hypertension        Past Surgical History:   Procedure Laterality Date    HERNIA REPAIR  1990    HYSTERECTOMY, TOTAL ABDOMINAL (CERVIX REMOVED)      2000    PAIN MANAGEMENT PROCEDURE Bilateral 7/26/2022    LUMBAR FACET medial branch blocks bilateral Lumbar 4 5  5 Sacral 1 performed by Porter Uribe DO at [facility] Bilateral 8/17/2022    medial branch blocks bilateral Lumbar4/5 and 5/Sacral 1 performed by Porter Uribe DO at [facility] Bilateral 9/20/2022    radiofrequency ablation targeting medial branches of bilateral Lumbar 4/5,Lumbar5/Sacral 1 performed by Porter Uribe DO at [address] [place] [address]  2015       No family history on file. Medications & Allergies:   Current Outpatient Medications   Medication Instructions    Cholecalciferol (VITAMIN D3) 125 MCG (5000 UT) TABS Oral    losartan (COZAAR) 50 mg, Oral, DAILY    metoprolol succinate (TOPROL XL) 25 mg, Oral, Nightly       Allergies   Allergen Reactions    Penicillins Hives       Review of Systems:   Constitutional: negative for weight changes or fevers  Genitourinary: negative for bowel/bladder incontinence   Musculoskeletal: positive for low back pain  Neurological: negative for any leg weakness or numbness/tingling  Behavioral/Psych: negative for anxiety/depression   All other systems reviewed and are negative    Objective: There were no vitals filed for this visit.     Constitutional: Pleasant, no acute distress   Head: Normocephalic, atraumatic   Eyes: Conjunctivae normal   Neck: Supple, symmetrical Lungs: Normal respiratory effort, non-labored breathing   Cardiovascular: Limbs warm and well perfused   Abdomen: Non-protruded   Musculoskeletal: Muscle bulk symmetric, no atrophy, no gross deformities    · Lumbar Spine: ROM reduced in extension. Lumbar paraspinals tender to palpation bilaterally. SLR neg bilaterally. SAVANNA neg bilaterally. Positive facet loading bilaterally. Bilateral SI joints non-tender to palpation. Neurological: Cranial nerves II-XII grossly intact. · Gait - Antalgic gait. Ambulates without assistive device. · Motor: No focal motor deficits in lower limbs  Skin: No rashes or lesions visible in low back  Psychological: Cooperative, no exaggerated pain behaviors       Assessment:    Diagnosis Orders   1. Lumbar spondylosis  XR LUMBAR SPINE (MIN 4 VIEWS)      2. Myofascial pain        3. Other chronic pain        4. Radiculopathy, lumbosacral region                  Eric Bronson is a 81 y. o.female presenting to the pain clinic for evaluation of chronic low back pain. Her clinical history and physical lamination are consistent with lumbar facet mediated pain secondary to lumbar spondylosis and degenerative disc disease. I set patient up for diagnostic lumbar medial branch block starting the bilateral facet joints of L4/L5 and L5/S1. We will anticipate proceeding forward with a lumbar radiofrequency ablation and complement this with weight loss and core strength and stability. She has completed her lumbar radiofrequency ablation but has noticed worsening pain in her thighs and could have worsening listhesis in her upper lumbar spine causing some new symptoms. I have ordered flex/ex lumbar spine imaging to further evaluate and may set her up for a lumbar epidural steroid injection. Plan: The following treatment recommendations and plan were discussed in detail with Bev Hein.     Imaging/Studies/Labs:   I have reviewed patients imaging of XR L-spine and results were discussed with patient today. Analgesics:   Patient is taking Acetaminophen. Patient informed that the maximum amount of acetaminophen taken on a regular basis should only be 4000 mg per day. Adjuvants:   None; patient wants to pursue injection therapy only. Interventions:   None    Anticoagulation/NPO Recommendations:   None    Multidisciplinary Pain Management:   In the presence of complex, chronic, and multi-factorial pain, the importance of a multidisciplinary approach to pain management in the patients management regimen was emphasized and discussed in great detail.    PHYSICAL THERAPY: Patient is advised to continue gentle ROM stretching as discussed in clinic    Referrals:  None    Prescriptions Written This Visit:   None    Follow-up: Pending lumbar spine XR results      Kristopher Combs DO  Interventional Pain Management/PM&R   [place]

## 2023-01-17 ENCOUNTER — APPOINTMENT (OUTPATIENT)
Dept: GENERAL RADIOLOGY | Age: 73
End: 2023-01-17
Attending: ANESTHESIOLOGY
Payer: MEDICARE

## 2023-01-17 ENCOUNTER — HOSPITAL ENCOUNTER (OUTPATIENT)
Age: 73
Setting detail: OUTPATIENT SURGERY
Discharge: HOME | End: 2023-01-17
Attending: ANESTHESIOLOGY | Admitting: ANESTHESIOLOGY
Payer: MEDICARE

## 2023-01-17 VITALS
SYSTOLIC BLOOD PRESSURE: 138 MMHG | TEMPERATURE: 98.1 F | RESPIRATION RATE: 16 BRPM | HEIGHT: 62 IN | OXYGEN SATURATION: 93 % | DIASTOLIC BLOOD PRESSURE: 65 MMHG | BODY MASS INDEX: 35.51 KG/M2 | HEART RATE: 73 BPM | WEIGHT: 193 LBS

## 2023-01-17 PROCEDURE — 3600000054 HC PAIN LEVEL 3 BASE: Performed by: ANESTHESIOLOGY

## 2023-01-17 PROCEDURE — 2500000003 HC RX 250 WO HCPCS: Performed by: ANESTHESIOLOGY

## 2023-01-17 PROCEDURE — 62323 NJX INTERLAMINAR LMBR/SAC: CPT | Performed by: ANESTHESIOLOGY

## 2023-01-17 PROCEDURE — 99152 MOD SED SAME PHYS/QHP 5/>YRS: CPT | Performed by: ANESTHESIOLOGY

## 2023-01-17 PROCEDURE — 7100000011 HC PHASE II RECOVERY - ADDTL 15 MIN: Performed by: ANESTHESIOLOGY

## 2023-01-17 PROCEDURE — 2709999900 HC NON-CHARGEABLE SUPPLY: Performed by: ANESTHESIOLOGY

## 2023-01-17 PROCEDURE — 6360000002 HC RX W HCPCS: Performed by: ANESTHESIOLOGY

## 2023-01-17 PROCEDURE — 3209999900 FLUORO FOR SURGICAL PROCEDURES

## 2023-01-17 PROCEDURE — 6360000004 HC RX CONTRAST MEDICATION: Performed by: ANESTHESIOLOGY

## 2023-01-17 PROCEDURE — 7100000010 HC PHASE II RECOVERY - FIRST 15 MIN: Performed by: ANESTHESIOLOGY

## 2023-01-17 RX ORDER — MIDAZOLAM HYDROCHLORIDE 1 MG/ML
INJECTION INTRAMUSCULAR; INTRAVENOUS PRN
Status: DISCONTINUED | OUTPATIENT
Start: 2023-01-17 | End: 2023-01-17 | Stop reason: ALTCHOICE

## 2023-01-17 RX ORDER — LIDOCAINE HYDROCHLORIDE 10 MG/ML
INJECTION, SOLUTION EPIDURAL; INFILTRATION; INTRACAUDAL; PERINEURAL PRN
Status: DISCONTINUED | OUTPATIENT
Start: 2023-01-17 | End: 2023-01-17 | Stop reason: ALTCHOICE

## 2023-01-17 RX ORDER — FENTANYL CITRATE 50 UG/ML
INJECTION, SOLUTION INTRAMUSCULAR; INTRAVENOUS PRN
Status: DISCONTINUED | OUTPATIENT
Start: 2023-01-17 | End: 2023-01-17 | Stop reason: ALTCHOICE

## 2023-01-17 RX ORDER — DEXAMETHASONE SODIUM PHOSPHATE 4 MG/ML
INJECTION, SOLUTION INTRA-ARTICULAR; INTRALESIONAL; INTRAMUSCULAR; INTRAVENOUS; SOFT TISSUE PRN
Status: DISCONTINUED | OUTPATIENT
Start: 2023-01-17 | End: 2023-01-17 | Stop reason: ALTCHOICE

## 2023-01-17 RX ORDER — BACTERIOSTATIC SODIUM CHLORIDE 0.9 %
VIAL (ML) INJECTION PRN
Status: DISCONTINUED | OUTPATIENT
Start: 2023-01-17 | End: 2023-01-17 | Stop reason: ALTCHOICE

## 2023-01-17 ASSESSMENT — PAIN - FUNCTIONAL ASSESSMENT: PAIN_FUNCTIONAL_ASSESSMENT: 0-10

## 2023-01-17 ASSESSMENT — PAIN SCALES - GENERAL: PAINLEVEL_OUTOF10: 0

## 2023-01-17 NOTE — POST SEDATION
[address] [address]  Sedation/Analgesia Post Sedation Record    Pt Name: Quincy Strauss  MRN: [id number]  YOB: 1950  Procedure Performed By: Faizan Hutchison DO  Primary Care Physician: Miriam Church DO    POST-PROCEDURE    Physicians/Assistants: Faizan Hutchison DO  Procedure Performed: See Procedure Note   Sedation/Anesthesia: Versed and Fentanyl (See procedure note for amount and duration)  Estimated Blood Loss:     0  ml  Specimens Removed: None        Complications: None           Alec Stevenson DO  Electronically signed 1/17/2023 at 6:30 PM

## 2023-01-17 NOTE — PROGRESS NOTES
Discharge instructions provided to patient, voiced understanding and call light given to the patient

## 2023-01-17 NOTE — PROCEDURES
Pre-operative Diagnosis: Radicular leg pain     Post-operative Diagnosis: Radicular leg pain     Procedure: Lumbar epidural steroid injection    Procedure Description:  After having obtained a signed informed consent, the patient was taken to the fluoroscopy suite and placed in the prone position. The patient's back was prepped with chloraprep and draped in a sterile fashion. A total of 1.5 cc of 1 % lidocaine was used to anesthetize the skin and underlying tissues. Under fluoroscopic guidance, a single 20G Tuohy needle was advanced using midline approach at the L2/L3 interspace until gaining the epidural space using the loss of resistance to saline syringe technique. There were no paresthesias, heme, or CSF aspiration. A total of 0.25 cc of Omnipaque 300 were injected having had adequate dye spread within the epidural space. Needle placement and contrast spread was confirmed using the AP and contralateral views. 10 mg of Dexamethasone with 1 cc of saline solution were injected in the epidural space. The needle was flushed and removed without any complication. The patient tolerated the procedure well and was transported to the recovery room. The patient was observed for 15 minutes to then discharged in an ambulatory fashion.     Procedural Complications: None  Estimated Blood Loss: 0 mL      IV sedation was used during the procedure:  - Moderate intravenous conscious sedation was supervised by Dr. Danette Chaves  - The patient was independently monitored by a Registered Nurse assigned to the procedure room  - Monitoring included automated blood pressure, continuous EKG, and continuous pulse oximetry  - The detailed conscious record is permanently stored in the [address] [address]  - The following is the conscious sedation record:  Start Time: 13:34  End Time : 13:49  Duration: 15 minutes   Medications Administered: 1 mg Versed, 50 mcg Fentanyl       Alec Rodriguez DO  Interventional Pain Management/PM&R [facility]

## 2023-01-17 NOTE — PROGRESS NOTES
1342 Patient arrived to phase II via cart. Spontaneous respiraitons even and unlabored. Placed on monitor--VSS. Report received from [facility] 23 Assessment completed. Patient is alert and oriented x4. IV capped off-- no complications. Patient denies pain--will monitor. Injection sites clean and dry. 1345 Snack and drink provided. Pt. Denies all other needs at this time. Call light handed to pt.  1405 RN at bedside. Pt. States readiness to be discharged. 1406 Pt. Standing at bedside and denies weakness or dizziness. 1407 INT removed. No complications noted. 1408 Pt. Getting self dressed in bed. Family notified of discharge. 1411 Pt. Ambulated to private vehicle in stable condition with RN at her side.

## 2023-01-17 NOTE — PRE SEDATION
[facility]  Pre-Sedation/Analgesia History & Physical    Pt Name: Bev Hein  MRN: [id number]  YOB: 1950  Provider Performing Procedure: Alec Rodriguez DO   Primary Care Physician: Vinayak Hirsch DO      MEDICAL HISTORY       has a past medical history of Arthritis and Hypertension.  SURGICAL HISTORY   has a past surgical history that includes hernia repair (1990); Hysterectomy, total abdominal; parathyroidectomy (2015); Pain management procedure (Bilateral, 7/26/2022); Pain management procedure (Bilateral, 8/17/2022); and Pain management procedure (Bilateral, 9/20/2022).    ALLERGIES   Allergies as of 01/04/2023 - Fully Reviewed 12/29/2022   Allergen Reaction Noted    Penicillins Hives 06/30/2016       MEDICATIONS   Prior to Admission medications    Medication Sig Start Date End Date Taking? Authorizing Provider   metoprolol succinate (TOPROL XL) 25 MG extended release tablet Take 25 mg by mouth at bedtime 6/27/22   Historical Provider, MD   Cholecalciferol (VITAMIN D3) 125 MCG (5000 UT) TABS Take by mouth    Historical Provider, MD   losartan (COZAAR) 50 MG tablet Take 50 mg by mouth daily  4/1/21   Historical Provider, MD     PHYSICAL:   Vitals:    01/17/23 1342   BP: 138/65   Pulse: 73   Resp: 16   Temp: 98.1 °F (36.7 °C)   SpO2: 93%     PLANNED PROCEDURE   See procedure note  SEDATION  Planned agent: Versed and Fentanyl  ASA Classification: 2  Class 1: A normal healthy patient  Class 2: Pt with mild to moderate systemic disease  Class 3: Severe systemic disease or disturbance  Class 4: Severe systemic disorders that are already life threatening.  Class 5: Moribund pt with little chances of survival, for more than 24 hours.  Mallampati I Airway Classification: 2    1. Pre-procedure diagnostic studies complete and results available.  2. Previous sedation/anesthesia experiences assessed.  3. The patient is an appropriate candidate to undergo the planned procedure sedation  and anesthesia. (Refer to nursing sedation/analgesia documentation record)  4. Formulation and discussion of sedation/procedure plan, risks, and expectations with patient and/or responsible adult completed. 5. Patient examined immediately prior to the procedure.  (Refer to nursing sedation/analgesia documentation record)    Lissette Guzman DO  Electronically signed 1/17/2023 at 6:30 PM

## 2023-08-07 ENCOUNTER — TELEPHONE (OUTPATIENT)
Dept: PHYSICAL MEDICINE AND REHAB | Age: 73
End: 2023-08-07

## (undated) DEVICE — SYRINGE MED 10ML LUERLOCK TIP W/O SFTY DISP

## (undated) DEVICE — SYRINGE MED 3ML CLR PLAS STD N CTRL LUERLOCK TIP DISP

## (undated) DEVICE — MARKER,SKIN,WI/RULER AND LABELS: Brand: MEDLINE

## (undated) DEVICE — NEEDLE SPNL 22GA L3.5IN BLK HUB S STL REG WALL FIT STYL W/

## (undated) DEVICE — NEEDLE HYPO 18GA L1.5IN THN WALL PIVOTING SHLD BVL ORIENTED

## (undated) DEVICE — HYPODERMIC SAFETY NEEDLE: Brand: MAGELLAN

## (undated) DEVICE — NEEDLE SPINAL 22GA L3.5IN SPINOCAN

## (undated) DEVICE — 1840 FOAM BLOCK NEEDLE COUNTER: Brand: DEVON

## (undated) DEVICE — TOWEL,OR,DSP,ST,BLUE,STD,4/PK,20PK/CS: Brand: MEDLINE

## (undated) DEVICE — APPLICATOR MEDICATED 3 CC SOLUTION CLR STRL CHLORAPREP

## (undated) DEVICE — 3 ML SYRINGE LUER-LOCK TIP: Brand: MONOJECT

## (undated) DEVICE — GAUZE SPONGES,USP TYPE VII GAUZE, 12 PLY: Brand: CURITY

## (undated) DEVICE — GLOVE BIOGEL POWDER FREE SZ 8

## (undated) DEVICE — COUNTER NDL 10 COUNT HLD 20 FOAM BLK SGL MAG

## (undated) DEVICE — 6 ML SYRINGE LUER-LOCK TIP: Brand: MONOJECT

## (undated) DEVICE — Device